# Patient Record
Sex: FEMALE | Race: WHITE
[De-identification: names, ages, dates, MRNs, and addresses within clinical notes are randomized per-mention and may not be internally consistent; named-entity substitution may affect disease eponyms.]

---

## 2017-01-03 RX ORDER — GABAPENTIN 600 MG/1
600 TABLET ORAL NIGHTLY
Qty: 90 TABLET | Refills: 1 | Status: SHIPPED | OUTPATIENT
Start: 2017-01-03 | End: 2017-06-10

## 2017-02-27 RX ORDER — LEVOTHYROXINE SODIUM 0.07 MG/1
TABLET ORAL
Qty: 30 TABLET | Refills: 0 | Status: SHIPPED | OUTPATIENT
Start: 2017-02-27 | End: 2017-06-26

## 2017-03-23 ENCOUNTER — PRIOR ORIGINAL RECORDS (OUTPATIENT)
Dept: OTHER | Age: 72
End: 2017-03-23

## 2017-03-30 ENCOUNTER — PRIOR ORIGINAL RECORDS (OUTPATIENT)
Dept: OTHER | Age: 72
End: 2017-03-30

## 2017-03-31 ENCOUNTER — HOSPITAL ENCOUNTER (OUTPATIENT)
Dept: LAB | Facility: HOSPITAL | Age: 72
Discharge: HOME | End: 2017-03-31
Attending: INTERNAL MEDICINE
Payer: MEDICARE

## 2017-03-31 PROCEDURE — 36415 COLL VENOUS BLD VENIPUNCTURE: CPT | Performed by: INTERNAL MEDICINE

## 2017-03-31 PROCEDURE — 80061 LIPID PANEL: CPT | Performed by: INTERNAL MEDICINE

## 2017-03-31 PROCEDURE — 80053 COMPREHEN METABOLIC PANEL: CPT | Performed by: INTERNAL MEDICINE

## 2017-04-03 ENCOUNTER — PRIOR ORIGINAL RECORDS (OUTPATIENT)
Dept: OTHER | Age: 72
End: 2017-04-03

## 2017-04-10 ENCOUNTER — PRIOR ORIGINAL RECORDS (OUTPATIENT)
Dept: OTHER | Age: 72
End: 2017-04-10

## 2017-04-18 ENCOUNTER — PRIOR ORIGINAL RECORDS (OUTPATIENT)
Dept: OTHER | Age: 72
End: 2017-04-18

## 2017-06-13 RX ORDER — GABAPENTIN 600 MG/1
TABLET ORAL
Qty: 30 TABLET | Refills: 0 | Status: SHIPPED | OUTPATIENT
Start: 2017-06-13 | End: 2017-06-26 | Stop reason: DRUGHIGH

## 2017-06-13 NOTE — TELEPHONE ENCOUNTER
I have only provided 30 tablets as she is due for her medicare wellness exam (45 minute spot). Please call her and schedule an appt.

## 2017-06-21 ENCOUNTER — PRIOR ORIGINAL RECORDS (OUTPATIENT)
Dept: OTHER | Age: 72
End: 2017-06-21

## 2017-06-21 ENCOUNTER — APPOINTMENT (OUTPATIENT)
Dept: LAB | Age: 72
End: 2017-06-21
Attending: INTERNAL MEDICINE
Payer: MEDICARE

## 2017-06-21 DIAGNOSIS — R73.03 PREDIABETES: ICD-10-CM

## 2017-06-21 DIAGNOSIS — E03.9 HYPOTHYROIDISM, UNSPECIFIED TYPE: ICD-10-CM

## 2017-06-21 DIAGNOSIS — E78.2 MIXED HYPERLIPIDEMIA: ICD-10-CM

## 2017-06-21 PROCEDURE — 83036 HEMOGLOBIN GLYCOSYLATED A1C: CPT

## 2017-06-21 PROCEDURE — 84450 TRANSFERASE (AST) (SGOT): CPT

## 2017-06-21 PROCEDURE — 36415 COLL VENOUS BLD VENIPUNCTURE: CPT

## 2017-06-21 PROCEDURE — 80061 LIPID PANEL: CPT

## 2017-06-21 PROCEDURE — 80048 BASIC METABOLIC PNL TOTAL CA: CPT

## 2017-06-21 PROCEDURE — 84460 ALANINE AMINO (ALT) (SGPT): CPT

## 2017-06-21 PROCEDURE — 84443 ASSAY THYROID STIM HORMONE: CPT

## 2017-06-21 PROCEDURE — 84439 ASSAY OF FREE THYROXINE: CPT

## 2017-06-26 ENCOUNTER — OFFICE VISIT (OUTPATIENT)
Dept: INTERNAL MEDICINE CLINIC | Facility: CLINIC | Age: 72
End: 2017-06-26

## 2017-06-26 VITALS
SYSTOLIC BLOOD PRESSURE: 122 MMHG | HEIGHT: 66 IN | TEMPERATURE: 99 F | RESPIRATION RATE: 20 BRPM | DIASTOLIC BLOOD PRESSURE: 64 MMHG | BODY MASS INDEX: 25.88 KG/M2 | HEART RATE: 76 BPM | WEIGHT: 161 LBS

## 2017-06-26 DIAGNOSIS — M89.9 BONE DISORDER: ICD-10-CM

## 2017-06-26 DIAGNOSIS — E78.2 MIXED HYPERLIPIDEMIA: ICD-10-CM

## 2017-06-26 DIAGNOSIS — I25.10 ATHEROSCLEROSIS OF NATIVE CORONARY ARTERY OF NATIVE HEART WITHOUT ANGINA PECTORIS: ICD-10-CM

## 2017-06-26 DIAGNOSIS — Z13.31 SCREENING FOR DEPRESSION: ICD-10-CM

## 2017-06-26 DIAGNOSIS — M47.816 FACET ARTHROPATHY, LUMBAR: ICD-10-CM

## 2017-06-26 DIAGNOSIS — E03.9 HYPOTHYROIDISM, UNSPECIFIED TYPE: ICD-10-CM

## 2017-06-26 DIAGNOSIS — R73.01 IMPAIRED FASTING GLUCOSE: ICD-10-CM

## 2017-06-26 DIAGNOSIS — Z00.00 ENCOUNTER FOR ANNUAL HEALTH EXAMINATION: Primary | ICD-10-CM

## 2017-06-26 PROCEDURE — 99214 OFFICE O/P EST MOD 30 MIN: CPT | Performed by: INTERNAL MEDICINE

## 2017-06-26 PROCEDURE — G0444 DEPRESSION SCREEN ANNUAL: HCPCS | Performed by: INTERNAL MEDICINE

## 2017-06-26 PROCEDURE — G0439 PPPS, SUBSEQ VISIT: HCPCS | Performed by: INTERNAL MEDICINE

## 2017-06-26 RX ORDER — GABAPENTIN 100 MG/1
100 CAPSULE ORAL SEE ADMIN INSTRUCTIONS
Qty: 90 CAPSULE | Refills: 0 | Status: SHIPPED | OUTPATIENT
Start: 2017-06-26 | End: 2017-06-26

## 2017-06-26 RX ORDER — LEVOTHYROXINE SODIUM 0.07 MG/1
TABLET ORAL
Qty: 90 TABLET | Refills: 3 | Status: SHIPPED | OUTPATIENT
Start: 2017-06-26 | End: 2018-05-14

## 2017-06-26 RX ORDER — GABAPENTIN 100 MG/1
100 CAPSULE ORAL SEE ADMIN INSTRUCTIONS
Qty: 90 CAPSULE | Refills: 0 | Status: SHIPPED | OUTPATIENT
Start: 2017-06-26 | End: 2018-03-21 | Stop reason: ALTCHOICE

## 2017-06-26 NOTE — PROGRESS NOTES
HPI:   Zak Adames is a 74year old female who presents for a medicare wellness exam and additional issues noted below. 1. HLP: currently doing well on repatha but only taking it every 3 weeks because it raised her BP.  Following with Dr. Veronica Juarez statins; sulfa antibiotics; and sulfa drugs cross reactors.     CURRENT MEDICATIONS:     Outpatient Prescriptions Marked as Taking for the 6/26/17 encounter (Office Visit) with Aleida Billings MD:  GABAPENTIN 600 MG Oral Tab TAKE 1 TABLET NIGHTLY   LEVOTHYROX constipation, diarrhea, melena, hematochezia, n/v.   : denies dysuria or hematuria, vaginal discharge or itching or vaginal bleeding. occ has sress urinary incontinence if she has not gone in a while.    MUSCULOSKELETAL: see HPI  NEURO: denies headaches, Maryam Majano already takes aspirin and has it on her medication list.     Diet assessment: Fair     Advanced Directive:  Living Will on file in [address]? Grady Sams does not have a Living Will on file in [address]  See Below       [address] on fi 0-No    Do you have difficulty seeing?: 1-Yes    Do you have any difficulty walking or getting up?: 0-No    Do you have any tripping hazards?: 0-No    Are you on multiple medications?: 1-Yes    Does pain affect your day to day activities?: 0-No     Have yo found. Fecal Occult Blood Annually No results found for: FOB No flowsheet data found.     Glaucoma Screening      Ophthalmology Visit Annually: Diabetics, FHx Glaucoma, AA>50, > 65 Data entered on: 7/22/2013   Last Dilated Eye Exam 7/22/2013 Lab or Procedure   Annual Monitoring of Persistent     Medications (ACE/ARB, digoxin diuretics, anticonvulsants.)    Potassium  Annually Potassium (mmol/L)   Date Value   06/21/2017 4.4     POTASSIUM (mmol/L)   Date Value   05/21/2014 4.5   08/25/2011 4.4 # Hypothryoid: well controlled on synthroid, normal TSH 6/2017  # Health Maintenance: medicare wellness exam on 6/26/2017  Colon Cancer Screening: very informed about screening, declines colnoscopy OR FIT testing  Cervical Cancer Screening: s/p PRETTY Gipson

## 2017-06-26 NOTE — PATIENT INSTRUCTIONS
Please drop off your MRI brain reports. You need 3 eight ounce servings of calcium/vitamin D daily. This includes spinach, kale, broccoli, almonds, milk, yogurt, or cottage cheese.      I do advise you get the TDAP (tetanus, diptheriae, pertussis) vacci

## 2017-07-03 NOTE — PROGRESS NOTES
She brought in MRI brain report from 10/15/2002: nonspecific small white matter lesions b/l, largest measuring only 5 mm in size. Considerations include small fossae of white matter ischemia, chronic vasculitis,  Or migraine diseasse.  Possibly demyelinatin

## 2017-07-11 ENCOUNTER — PRIOR ORIGINAL RECORDS (OUTPATIENT)
Dept: OTHER | Age: 72
End: 2017-07-11

## 2017-07-19 LAB
BUN: 15 MG/DL
CALCIUM: 9 MG/DL
CHLORIDE: 110 MEQ/L
CHOLESTEROL, TOTAL: 163 MG/DL
CREATININE, SERUM: 0.91 MG/DL
FREE T4: 1.2 MG/DL
GLUCOSE: 127 MG/DL
HDL CHOLESTEROL: 51 MG/DL
HEMOGLOBIN A1C: 6.2 %
LDL CHOLESTEROL: 79 MG/DL
POTASSIUM, SERUM: 4.4 MEQ/L
SGOT (AST): 16 IU/L
SGPT (ALT): 32 IU/L
SODIUM: 142 MEQ/L
THYROID STIMULATING HORMONE: 3.22 MLU/L
TRIGLYCERIDES: 163 MG/DL

## 2018-01-02 ENCOUNTER — PRIOR ORIGINAL RECORDS (OUTPATIENT)
Dept: OTHER | Age: 73
End: 2018-01-02

## 2018-01-02 ENCOUNTER — LABORATORY ENCOUNTER (OUTPATIENT)
Dept: LAB | Age: 73
End: 2018-01-02
Attending: INTERNAL MEDICINE
Payer: MEDICARE

## 2018-01-02 DIAGNOSIS — E78.00 PURE HYPERCHOLESTEROLEMIA: Primary | ICD-10-CM

## 2018-01-02 LAB
ALBUMIN SERPL-MCNC: 3.8 G/DL (ref 3.5–4.8)
ALP LIVER SERPL-CCNC: 73 U/L (ref 55–142)
ALT SERPL-CCNC: 36 U/L (ref 14–54)
AST SERPL-CCNC: 26 U/L (ref 15–41)
BILIRUB SERPL-MCNC: 0.4 MG/DL (ref 0.1–2)
BUN BLD-MCNC: 19 MG/DL (ref 8–20)
CALCIUM BLD-MCNC: 9.2 MG/DL (ref 8.3–10.3)
CHLORIDE: 108 MMOL/L (ref 101–111)
CHOLEST SMN-MCNC: 159 MG/DL (ref ?–200)
CO2: 27 MMOL/L (ref 22–32)
CREAT BLD-MCNC: 0.88 MG/DL (ref 0.55–1.02)
EST. AVERAGE GLUCOSE BLD GHB EST-MCNC: 143 MG/DL (ref 68–126)
GLUCOSE BLD-MCNC: 101 MG/DL (ref 70–99)
HBA1C MFR BLD HPLC: 6.6 % (ref ?–5.7)
HDLC SERPL-MCNC: 53 MG/DL (ref 45–?)
HDLC SERPL: 3 {RATIO} (ref ?–4.44)
LDLC SERPL CALC-MCNC: 86 MG/DL (ref ?–130)
M PROTEIN MFR SERPL ELPH: 7.1 G/DL (ref 6.1–8.3)
NONHDLC SERPL-MCNC: 106 MG/DL (ref ?–130)
POTASSIUM SERPL-SCNC: 4.5 MMOL/L (ref 3.6–5.1)
SODIUM SERPL-SCNC: 142 MMOL/L (ref 136–144)
TRIGL SERPL-MCNC: 100 MG/DL (ref ?–150)
VLDLC SERPL CALC-MCNC: 20 MG/DL (ref 5–40)

## 2018-01-02 PROCEDURE — 83036 HEMOGLOBIN GLYCOSYLATED A1C: CPT

## 2018-01-02 PROCEDURE — 80053 COMPREHEN METABOLIC PANEL: CPT

## 2018-01-02 PROCEDURE — 36415 COLL VENOUS BLD VENIPUNCTURE: CPT

## 2018-01-02 PROCEDURE — 80061 LIPID PANEL: CPT

## 2018-01-03 ENCOUNTER — PRIOR ORIGINAL RECORDS (OUTPATIENT)
Dept: OTHER | Age: 73
End: 2018-01-03

## 2018-01-03 LAB
ALBUMIN: 3.8 G/DL
ALKALINE PHOSPHATATE(ALK PHOS): 73 IU/L
ALT (SGPT): 36 U/L
AST (SGOT): 26 U/L
BILIRUBIN TOTAL: 0.4 MG/DL
BUN: 19 MG/DL
CALCIUM: 9.2 MG/DL
CHLORIDE: 108 MEQ/L
CHOLESTEROL, TOTAL: 159 MG/DL
CREATININE, SERUM: 0.88 MG/DL
GLUCOSE: 101 MG/DL
GLUCOSE: 101 MG/DL
HDL CHOLESTEROL: 53 MG/DL
HEMOGLOBIN A1C: 6.6 %
LDL CHOLESTEROL: 86 MG/DL
NON-HDL CHOLESTEROL: 106 MG/DL
POTASSIUM, SERUM: 4.5 MEQ/L
PROTEIN, TOTAL: 7.1 G/DL
SGOT (AST): 26 IU/L
SGPT (ALT): 36 IU/L
SODIUM: 142 MEQ/L
TOTAL CHOLESTEROL / HDL RATIO: 3 RATIO UN
TRIGLYCERIDES: 100 MG/DL

## 2018-01-08 ENCOUNTER — PRIOR ORIGINAL RECORDS (OUTPATIENT)
Dept: OTHER | Age: 73
End: 2018-01-08

## 2018-01-09 ENCOUNTER — PRIOR ORIGINAL RECORDS (OUTPATIENT)
Dept: OTHER | Age: 73
End: 2018-01-09

## 2018-01-15 ENCOUNTER — PRIOR ORIGINAL RECORDS (OUTPATIENT)
Dept: OTHER | Age: 73
End: 2018-01-15

## 2018-01-19 ENCOUNTER — PRIOR ORIGINAL RECORDS (OUTPATIENT)
Dept: OTHER | Age: 73
End: 2018-01-19

## 2018-01-24 ENCOUNTER — PRIOR ORIGINAL RECORDS (OUTPATIENT)
Dept: OTHER | Age: 73
End: 2018-01-24

## 2018-03-13 ENCOUNTER — LAB ENCOUNTER (OUTPATIENT)
Dept: LAB | Age: 73
End: 2018-03-13
Attending: INTERNAL MEDICINE
Payer: MEDICARE

## 2018-03-13 ENCOUNTER — PRIOR ORIGINAL RECORDS (OUTPATIENT)
Dept: OTHER | Age: 73
End: 2018-03-13

## 2018-03-13 DIAGNOSIS — I25.10 CORONARY ATHEROSCLEROSIS OF NATIVE CORONARY ARTERY: Primary | ICD-10-CM

## 2018-03-13 DIAGNOSIS — E78.00 PURE HYPERCHOLESTEROLEMIA: ICD-10-CM

## 2018-03-13 LAB
ALBUMIN SERPL-MCNC: 3.8 G/DL (ref 3.5–4.8)
ALP LIVER SERPL-CCNC: 78 U/L (ref 55–142)
ALT SERPL-CCNC: 26 U/L (ref 14–54)
AST SERPL-CCNC: 19 U/L (ref 15–41)
BILIRUB SERPL-MCNC: 0.6 MG/DL (ref 0.1–2)
BUN BLD-MCNC: 13 MG/DL (ref 8–20)
CALCIUM BLD-MCNC: 9.4 MG/DL (ref 8.3–10.3)
CHLORIDE: 109 MMOL/L (ref 101–111)
CHOLEST SMN-MCNC: 255 MG/DL (ref ?–200)
CO2: 26 MMOL/L (ref 22–32)
CREAT BLD-MCNC: 0.93 MG/DL (ref 0.55–1.02)
EST. AVERAGE GLUCOSE BLD GHB EST-MCNC: 131 MG/DL (ref 68–126)
GLUCOSE BLD-MCNC: 128 MG/DL (ref 70–99)
HBA1C MFR BLD HPLC: 6.2 % (ref ?–5.7)
HDLC SERPL-MCNC: 47 MG/DL (ref 45–?)
HDLC SERPL: 5.43 {RATIO} (ref ?–4.44)
LDLC SERPL CALC-MCNC: 176 MG/DL (ref ?–130)
M PROTEIN MFR SERPL ELPH: 7.2 G/DL (ref 6.1–8.3)
NONHDLC SERPL-MCNC: 208 MG/DL (ref ?–130)
POTASSIUM SERPL-SCNC: 4.4 MMOL/L (ref 3.6–5.1)
SODIUM SERPL-SCNC: 142 MMOL/L (ref 136–144)
TRIGL SERPL-MCNC: 160 MG/DL (ref ?–150)
VLDLC SERPL CALC-MCNC: 32 MG/DL (ref 5–40)

## 2018-03-13 PROCEDURE — 80053 COMPREHEN METABOLIC PANEL: CPT

## 2018-03-13 PROCEDURE — 36415 COLL VENOUS BLD VENIPUNCTURE: CPT

## 2018-03-13 PROCEDURE — 80061 LIPID PANEL: CPT

## 2018-03-13 PROCEDURE — 83036 HEMOGLOBIN GLYCOSYLATED A1C: CPT

## 2018-03-14 LAB
ALKALINE PHOSPHATATE(ALK PHOS): 78 IU/L
BILIRUBIN TOTAL: 0.6 MG/DL
BUN: 13 MG/DL
CALCIUM: 9.4 MG/DL
CHLORIDE: 109 MEQ/L
CHOLESTEROL, TOTAL: 255 MG/DL
CREATININE, SERUM: 0.93 MG/DL
GLUCOSE: 128 MG/DL
HDL CHOLESTEROL: 47 MG/DL
HEMOGLOBIN A1C: 6.2 %
LDL CHOLESTEROL: 176 MG/DL
POTASSIUM, SERUM: 4.4 MEQ/L
PROTEIN, TOTAL: 7.2 G/DL
SGOT (AST): 19 IU/L
SGPT (ALT): 26 IU/L
SODIUM: 142 MEQ/L
TRIGLYCERIDES: 160 MG/DL

## 2018-03-21 ENCOUNTER — PRIOR ORIGINAL RECORDS (OUTPATIENT)
Dept: OTHER | Age: 73
End: 2018-03-21

## 2018-03-21 ENCOUNTER — HOSPITAL ENCOUNTER (OUTPATIENT)
Dept: GENERAL RADIOLOGY | Age: 73
Discharge: HOME | End: 2018-03-21
Attending: NURSE PRACTITIONER
Payer: MEDICARE

## 2018-03-21 ENCOUNTER — OFFICE VISIT (OUTPATIENT)
Dept: INTERNAL MEDICINE CLINIC | Facility: CLINIC | Age: 73
End: 2018-03-21

## 2018-03-21 VITALS
BODY MASS INDEX: 26 KG/M2 | SYSTOLIC BLOOD PRESSURE: 88 MMHG | HEART RATE: 62 BPM | OXYGEN SATURATION: 99 % | DIASTOLIC BLOOD PRESSURE: 56 MMHG | WEIGHT: 161 LBS | RESPIRATION RATE: 16 BRPM | TEMPERATURE: 98 F

## 2018-03-21 DIAGNOSIS — M25.551 RIGHT HIP PAIN: ICD-10-CM

## 2018-03-21 DIAGNOSIS — M79.10 MYALGIA: ICD-10-CM

## 2018-03-21 DIAGNOSIS — M54.41 ACUTE RIGHT-SIDED LOW BACK PAIN WITH RIGHT-SIDED SCIATICA: Primary | ICD-10-CM

## 2018-03-21 DIAGNOSIS — M54.41 ACUTE RIGHT-SIDED LOW BACK PAIN WITH RIGHT-SIDED SCIATICA: ICD-10-CM

## 2018-03-21 DIAGNOSIS — I83.893 VARICOSE VEINS OF LEG WITH EDEMA, BILATERAL: ICD-10-CM

## 2018-03-21 PROCEDURE — 99213 OFFICE O/P EST LOW 20 MIN: CPT | Performed by: NURSE PRACTITIONER

## 2018-03-21 PROCEDURE — 73502 X-RAY EXAM HIP UNI 2-3 VIEWS: CPT | Performed by: NURSE PRACTITIONER

## 2018-03-21 PROCEDURE — 72110 X-RAY EXAM L-2 SPINE 4/>VWS: CPT | Performed by: NURSE PRACTITIONER

## 2018-03-21 NOTE — PROGRESS NOTES
CHIEF COMPLAINT:     Patient presents with:  Leg Swelling: 3x weeks Right leg swelling pt is taking OTC aleeve      HPI:   Sapna Roberts is a 74year old female who presents with bilateral leg swelling for the past 3 weeks. Denies any injury.  The swellin 1.00      Years: 30.00        Quit date: 1/18/2008  Smokeless tobacco: Never Used                      Comment: intermittent for 30 years  Alcohol use:  Yes              Comment: 1-2x/month       REVIEW OF SYSTEMS:   GENERAL: Denies fever, chills, weight ch too expensive may try Icy hot  - Encouraged to complete her DEXA scan as she has never done so, plans to complete in April    3. Myalgia  - Chronic secondary to statin use    4.  Varicose veins of leg with edema, bilateral  - Discussed compression stockings

## 2018-03-23 ENCOUNTER — PRIOR ORIGINAL RECORDS (OUTPATIENT)
Dept: OTHER | Age: 73
End: 2018-03-23

## 2018-03-26 ENCOUNTER — TELEPHONE (OUTPATIENT)
Dept: INTERNAL MEDICINE CLINIC | Facility: CLINIC | Age: 73
End: 2018-03-26

## 2018-03-26 NOTE — TELEPHONE ENCOUNTER
$800 for diclofenac? ? We had discussed if it was very expensive to try icy hot as pain is mixed arthritis and muscle strain.

## 2018-03-26 NOTE — TELEPHONE ENCOUNTER
Patient called to speak with nurse regarding ointment that was prescribed.  Ointment is $800 and pt is requesting another medication

## 2018-04-02 DIAGNOSIS — M17.11 OSTEOARTHRITIS OF RIGHT KNEE, UNSPECIFIED OSTEOARTHRITIS TYPE: Primary | ICD-10-CM

## 2018-04-02 NOTE — TELEPHONE ENCOUNTER
Diclofenac Sodium 3 % Transdermal Gel     For 3% rx is $800+ for patient; [address] calling to see if they can change to 1%?   Please callback

## 2018-04-04 ENCOUNTER — PRIOR ORIGINAL RECORDS (OUTPATIENT)
Dept: OTHER | Age: 73
End: 2018-04-04

## 2018-05-14 RX ORDER — LEVOTHYROXINE SODIUM 0.07 MG/1
TABLET ORAL
Qty: 90 TABLET | Refills: 3 | Status: SHIPPED | OUTPATIENT
Start: 2018-05-14 | End: 2019-03-13

## 2018-05-29 ENCOUNTER — TELEPHONE (OUTPATIENT)
Dept: INTERNAL MEDICINE CLINIC | Facility: CLINIC | Age: 73
End: 2018-05-29

## 2018-05-29 NOTE — TELEPHONE ENCOUNTER
Patient calling in with pain symptoms running passed her hips to her knees. Patient has been taking Aleve and that is now upsetting her stomach and is unable to bare the pain.    Offered patient an appointment with another Physician, patient refused and sta

## 2018-06-08 ENCOUNTER — OFFICE VISIT (OUTPATIENT)
Dept: INTERNAL MEDICINE CLINIC | Facility: CLINIC | Age: 73
End: 2018-06-08

## 2018-06-08 VITALS
WEIGHT: 159 LBS | BODY MASS INDEX: 24.96 KG/M2 | TEMPERATURE: 98 F | DIASTOLIC BLOOD PRESSURE: 82 MMHG | SYSTOLIC BLOOD PRESSURE: 120 MMHG | RESPIRATION RATE: 16 BRPM | HEIGHT: 67 IN | HEART RATE: 66 BPM

## 2018-06-08 DIAGNOSIS — Z12.83 SCREENING EXAM FOR SKIN CANCER: ICD-10-CM

## 2018-06-08 DIAGNOSIS — E78.2 MIXED HYPERLIPIDEMIA: ICD-10-CM

## 2018-06-08 DIAGNOSIS — M85.80 OSTEOPENIA, UNSPECIFIED LOCATION: ICD-10-CM

## 2018-06-08 DIAGNOSIS — M54.41 CHRONIC MIDLINE LOW BACK PAIN WITH BILATERAL SCIATICA: ICD-10-CM

## 2018-06-08 DIAGNOSIS — G89.29 CHRONIC MIDLINE LOW BACK PAIN WITH BILATERAL SCIATICA: ICD-10-CM

## 2018-06-08 DIAGNOSIS — I25.10 ATHEROSCLEROSIS OF NATIVE CORONARY ARTERY OF NATIVE HEART WITHOUT ANGINA PECTORIS: ICD-10-CM

## 2018-06-08 DIAGNOSIS — M54.42 CHRONIC MIDLINE LOW BACK PAIN WITH BILATERAL SCIATICA: ICD-10-CM

## 2018-06-08 DIAGNOSIS — Z00.00 ROUTINE GENERAL MEDICAL EXAMINATION AT A HEALTH CARE FACILITY: Primary | ICD-10-CM

## 2018-06-08 DIAGNOSIS — M89.9 DISORDER OF BONE AND CARTILAGE: ICD-10-CM

## 2018-06-08 DIAGNOSIS — E03.8 OTHER SPECIFIED HYPOTHYROIDISM: ICD-10-CM

## 2018-06-08 DIAGNOSIS — R73.01 IMPAIRED FASTING GLUCOSE: ICD-10-CM

## 2018-06-08 DIAGNOSIS — Z12.11 SCREEN FOR COLON CANCER: ICD-10-CM

## 2018-06-08 DIAGNOSIS — M47.816 FACET ARTHROPATHY, LUMBAR: ICD-10-CM

## 2018-06-08 DIAGNOSIS — M94.9 DISORDER OF BONE AND CARTILAGE: ICD-10-CM

## 2018-06-08 PROCEDURE — G0444 DEPRESSION SCREEN ANNUAL: HCPCS | Performed by: INTERNAL MEDICINE

## 2018-06-08 PROCEDURE — G0439 PPPS, SUBSEQ VISIT: HCPCS | Performed by: INTERNAL MEDICINE

## 2018-06-08 PROCEDURE — 99214 OFFICE O/P EST MOD 30 MIN: CPT | Performed by: INTERNAL MEDICINE

## 2018-06-08 NOTE — PROGRESS NOTES
Marti Wood is a 74year old female. HPI:   Patient presents for the following issues and medicare wellness exam.   1. HLP: she has resumed repatha in 4/2018 and taking it every 3 weeks to avoid hyperglycemia.   2. Impaired fasting glucose: repatha 164 lb  05/31/16 : 161 lb  02/22/16 : 159 lb        Penicillins             RASH, ITCHING  Statins                     Comment:myalgia  Sulfa Antibiotics         Sulfa Drugs Cross R*    RASH, ITCHING    Family History   Problem Relation Age of Onset   • He PLAN:   # Bilateral Lumbar Facet Arthritis causing low back pain with b/l sciatica: nsaids causing abdominal pain.  Discussed tylenol, core strengthening/stretches, PT, referral to spine team. At this time, she would like to start with HEP and tylenol for n

## 2018-06-08 NOTE — PATIENT INSTRUCTIONS
Please have fasting labs completed in early July. Please do not exceed 3000 mg (3 grams) of tylenol in 24 hours.    It is very important you look at the amount of tylenol (acetaminophen) in any of your over the counter medications to ensure you do not e

## 2018-07-06 ENCOUNTER — APPOINTMENT (OUTPATIENT)
Dept: LAB | Age: 73
End: 2018-07-06
Attending: INTERNAL MEDICINE
Payer: MEDICARE

## 2018-07-06 ENCOUNTER — PRIOR ORIGINAL RECORDS (OUTPATIENT)
Dept: OTHER | Age: 73
End: 2018-07-06

## 2018-07-06 ENCOUNTER — TELEPHONE (OUTPATIENT)
Dept: INTERNAL MEDICINE CLINIC | Facility: CLINIC | Age: 73
End: 2018-07-06

## 2018-07-06 DIAGNOSIS — E78.2 MIXED HYPERLIPIDEMIA: ICD-10-CM

## 2018-07-06 DIAGNOSIS — E03.8 OTHER SPECIFIED HYPOTHYROIDISM: ICD-10-CM

## 2018-07-06 DIAGNOSIS — I25.10 ATHEROSCLEROSIS OF CORONARY ARTERY OF NATIVE HEART, ANGINA PRESENCE UNSPECIFIED, UNSPECIFIED VESSEL OR LESION TYPE: ICD-10-CM

## 2018-07-06 DIAGNOSIS — R73.01 IMPAIRED FASTING GLUCOSE: ICD-10-CM

## 2018-07-06 DIAGNOSIS — I25.10 ATHEROSCLEROSIS OF NATIVE CORONARY ARTERY OF NATIVE HEART WITHOUT ANGINA PECTORIS: ICD-10-CM

## 2018-07-06 DIAGNOSIS — R73.01 IMPAIRED FASTING GLUCOSE: Primary | ICD-10-CM

## 2018-07-06 LAB
ALBUMIN SERPL-MCNC: 3.7 G/DL (ref 3.5–4.8)
ALP LIVER SERPL-CCNC: 75 U/L (ref 55–142)
ALT SERPL-CCNC: 38 U/L (ref 14–54)
AST SERPL-CCNC: 24 U/L (ref 15–41)
BILIRUB SERPL-MCNC: 0.6 MG/DL (ref 0.1–2)
BUN BLD-MCNC: 16 MG/DL (ref 8–20)
CALCIUM BLD-MCNC: 9.2 MG/DL (ref 8.3–10.3)
CHLORIDE: 108 MMOL/L (ref 101–111)
CHOLEST SMN-MCNC: 154 MG/DL (ref ?–200)
CO2: 26 MMOL/L (ref 22–32)
CREAT BLD-MCNC: 0.99 MG/DL (ref 0.55–1.02)
EST. AVERAGE GLUCOSE BLD GHB EST-MCNC: 143 MG/DL (ref 68–126)
FREE T4: 1.2 NG/DL (ref 0.9–1.8)
GLUCOSE BLD-MCNC: 115 MG/DL (ref 70–99)
HBA1C MFR BLD HPLC: 6.6 % (ref ?–5.7)
HDLC SERPL-MCNC: 50 MG/DL (ref 45–?)
HDLC SERPL: 3.08 {RATIO} (ref ?–4.44)
LDLC SERPL CALC-MCNC: 73 MG/DL (ref ?–130)
M PROTEIN MFR SERPL ELPH: 7.1 G/DL (ref 6.1–8.3)
NONHDLC SERPL-MCNC: 104 MG/DL (ref ?–130)
POTASSIUM SERPL-SCNC: 4.2 MMOL/L (ref 3.6–5.1)
SODIUM SERPL-SCNC: 142 MMOL/L (ref 136–144)
TRIGL SERPL-MCNC: 153 MG/DL (ref ?–150)
TSI SER-ACNC: 3.4 MIU/ML (ref 0.35–5.5)
VLDLC SERPL CALC-MCNC: 31 MG/DL (ref 5–40)

## 2018-07-06 PROCEDURE — 83036 HEMOGLOBIN GLYCOSYLATED A1C: CPT

## 2018-07-06 PROCEDURE — 36415 COLL VENOUS BLD VENIPUNCTURE: CPT

## 2018-07-06 PROCEDURE — 84443 ASSAY THYROID STIM HORMONE: CPT

## 2018-07-06 PROCEDURE — 80053 COMPREHEN METABOLIC PANEL: CPT

## 2018-07-06 PROCEDURE — 80061 LIPID PANEL: CPT

## 2018-07-06 PROCEDURE — 84439 ASSAY OF FREE THYROXINE: CPT

## 2018-07-06 NOTE — TELEPHONE ENCOUNTER
Requesting CMP as Dr. Sree Peace (cardiology) prefers, [facility] blanka STYLES ordered CMP

## 2018-07-10 ENCOUNTER — PRIOR ORIGINAL RECORDS (OUTPATIENT)
Dept: OTHER | Age: 73
End: 2018-07-10

## 2018-07-10 LAB
ALBUMIN: 3.7 G/DL
ALKALINE PHOSPHATATE(ALK PHOS): 75 IU/L
ALT (SGPT): 38 U/L
AST (SGOT): 24 U/L
BILIRUBIN TOTAL: 0.6 MG/DL
BUN: 16 MG/DL
CALCIUM: 9.2 MG/DL
CHLORIDE: 108 MEQ/L
CHOLESTEROL, TOTAL: 154 MG/DL
CHOLESTEROL, TOTAL: 154 MG/DL
CREATININE, SERUM: 0.99 MG/DL
FREE T4: 1.2 MG/DL
GLUCOSE: 115 MG/DL
GLUCOSE: 115 MG/DL
HDL CHOLESTEROL: 50 MG/DL
HDL CHOLESTEROL: 50 MG/DL
HEMOGLOBIN A1C: 6.6 %
LDL CHOLESTEROL: 73 MG/DL
LDL CHOLESTEROL: 73 MG/DL
NON-HDL CHOLESTEROL: 104 MG/DL
NON-HDL CHOLESTEROL: 104 MG/DL
POTASSIUM, SERUM: 4.2 MEQ/L
PROTEIN, TOTAL: 7.1 G/DL
SGOT (AST): 24 IU/L
SGPT (ALT): 38 IU/L
SODIUM: 142 MEQ/L
THYROID STIMULATING HORMONE: 3.4 MLU/L
TOTAL CHOLESTEROL / HDL RATIO: 3.08 RATIO UN
TRIGLYCERIDES: 153 MG/DL
TRIGLYCERIDES: 153 MG/DL

## 2018-07-12 ENCOUNTER — TELEPHONE (OUTPATIENT)
Dept: INTERNAL MEDICINE CLINIC | Facility: CLINIC | Age: 73
End: 2018-07-12

## 2018-07-12 NOTE — TELEPHONE ENCOUNTER
Dr. Daya Chowdhury asked her a question through [employer] and she doesn't know how to answer it on there.  Please call

## 2018-07-12 NOTE — TELEPHONE ENCOUNTER
Patient called she is currently taking Repatha every 3 weeks but will start repatha every 4 weeks to see if glucose improves.  Patient due for lab work in October for Dr. Halle Maldonado

## 2018-07-12 NOTE — TELEPHONE ENCOUNTER
Please ask her to add my blood-work onto labs in October (A1C order is in the system). She also needs to make an office visit with me after labs are completed in October, 2018.

## 2018-07-12 NOTE — TELEPHONE ENCOUNTER
Patient informed of orders, patient upset duplicate order as Dr. Veronica Juarez already ordered, removed Dr. Annie Higgins order with instructions to patient cc: to Dr. Eloy Knapp, patient verbalized understanding

## 2018-07-24 ENCOUNTER — HOSPITAL ENCOUNTER (OUTPATIENT)
Dept: BONE DENSITY | Age: 73
Discharge: HOME | End: 2018-07-24
Attending: INTERNAL MEDICINE
Payer: MEDICARE

## 2018-07-24 DIAGNOSIS — M89.9 DISORDER OF BONE AND CARTILAGE: ICD-10-CM

## 2018-07-24 DIAGNOSIS — M94.9 DISORDER OF BONE AND CARTILAGE: ICD-10-CM

## 2018-07-24 PROCEDURE — 77080 DXA BONE DENSITY AXIAL: CPT | Performed by: INTERNAL MEDICINE

## 2018-07-25 ENCOUNTER — TELEPHONE (OUTPATIENT)
Dept: INTERNAL MEDICINE CLINIC | Facility: CLINIC | Age: 73
End: 2018-07-25

## 2018-09-06 ENCOUNTER — TELEPHONE (OUTPATIENT)
Dept: INTERNAL MEDICINE CLINIC | Facility: CLINIC | Age: 73
End: 2018-09-06

## 2018-09-06 ENCOUNTER — OFFICE VISIT (OUTPATIENT)
Dept: FAMILY MEDICINE CLINIC | Facility: CLINIC | Age: 73
End: 2018-09-06
Payer: MEDICARE

## 2018-09-06 VITALS
DIASTOLIC BLOOD PRESSURE: 70 MMHG | HEART RATE: 76 BPM | OXYGEN SATURATION: 97 % | BODY MASS INDEX: 25.71 KG/M2 | RESPIRATION RATE: 16 BRPM | SYSTOLIC BLOOD PRESSURE: 114 MMHG | HEIGHT: 66 IN | TEMPERATURE: 99 F | WEIGHT: 160 LBS

## 2018-09-06 DIAGNOSIS — J06.9 URI WITH COUGH AND CONGESTION: Primary | ICD-10-CM

## 2018-09-06 PROCEDURE — 99213 OFFICE O/P EST LOW 20 MIN: CPT | Performed by: NURSE PRACTITIONER

## 2018-09-06 RX ORDER — AZITHROMYCIN 250 MG/1
TABLET, FILM COATED ORAL
Qty: 6 TABLET | Refills: 0 | Status: SHIPPED | OUTPATIENT
Start: 2018-09-06 | End: 2018-10-16

## 2018-09-06 NOTE — PROGRESS NOTES
CHIEF COMPLAINT:   Patient presents with:  Sinus Problem: sinus pressure (frontal)  Cough: with yellow phlegm    HPI:   Marti Wood is a 74year old female who presents with upper respiratory symptoms for  3  days.  Patient reports congestion, low grade Smoking status: Former Smoker                                                              Packs/day: 1.00      Years: 30.00        Quit date: 1/18/2008  Smokeless tobacco: Never Used                      Comment: intermittent for 30 years  Alcohol use: Jacob Patten Sig: Take two tablets by mouth today, then one daily. Risks, benefits, and side effects of medication explained and discussed.     Patient Instructions     Viral Upper Respiratory Illness (Adult)  You have a viral upper respiratory illness (URI · Over-the-counter cold medicines will not shorten the length of time you’re sick, but they may be helpful for the following symptoms: cough, sore throat, and nasal and sinus congestion.  (Note: Do not use decongestants if you have high blood pressure.)  Fo

## 2018-09-06 NOTE — TELEPHONE ENCOUNTER
Patient calling in seeking an appointment today due to a low grade fever, congestion, headache, pressure, etc.    Please call patient to discuss symptoms/appointment options.

## 2018-09-06 NOTE — TELEPHONE ENCOUNTER
Called pt and she states that \"I have a cold\". She stated the symptoms started on Tuesday and yesterday she started to develop a low grade fever. Pt states that she has congestion and cough with \"yellow-like\" phlegm.  Pt states she has a susceptibility

## 2018-10-02 ENCOUNTER — PRIOR ORIGINAL RECORDS (OUTPATIENT)
Dept: OTHER | Age: 73
End: 2018-10-02

## 2018-10-02 ENCOUNTER — LAB ENCOUNTER (OUTPATIENT)
Dept: LAB | Age: 73
End: 2018-10-02
Attending: INTERNAL MEDICINE
Payer: MEDICARE

## 2018-10-02 DIAGNOSIS — I50.1 LEFT HEART FAILURE (HCC): ICD-10-CM

## 2018-10-02 DIAGNOSIS — I25.10 CORONARY ATHEROSCLEROSIS OF NATIVE CORONARY ARTERY: Primary | ICD-10-CM

## 2018-10-02 DIAGNOSIS — E78.00 PURE HYPERCHOLESTEROLEMIA: ICD-10-CM

## 2018-10-02 PROCEDURE — 80061 LIPID PANEL: CPT

## 2018-10-02 PROCEDURE — 83036 HEMOGLOBIN GLYCOSYLATED A1C: CPT

## 2018-10-02 PROCEDURE — 80053 COMPREHEN METABOLIC PANEL: CPT

## 2018-10-02 PROCEDURE — 36415 COLL VENOUS BLD VENIPUNCTURE: CPT

## 2018-10-08 ENCOUNTER — PRIOR ORIGINAL RECORDS (OUTPATIENT)
Dept: OTHER | Age: 73
End: 2018-10-08

## 2018-10-08 LAB
ALBUMIN: 3.7 G/DL
ALKALINE PHOSPHATATE(ALK PHOS): 75 IU/L
ALT (SGPT): 34 U/L
AST (SGOT): 19 U/L
BILIRUBIN TOTAL: 0.6 MG/DL
BUN: 17 MG/DL
CALCIUM: 8.8 MG/DL
CHLORIDE: 110 MEQ/L
CHOLESTEROL, TOTAL: 173 MG/DL
CREATININE, SERUM: 0.86 MG/DL
GLOBULIN: 3.3 G/DL
GLUCOSE: 119 MG/DL
GLUCOSE: 119 MG/DL
HDL CHOLESTEROL: 48 MG/DL
HEMOGLOBIN A1C: 6.5 %
LDL CHOLESTEROL: 94 MG/DL
NON-HDL CHOLESTEROL: 125 MG/DL
POTASSIUM, SERUM: 4.6 MEQ/L
PROTEIN, TOTAL: 7 G/DL
SGOT (AST): 19 IU/L
SGPT (ALT): 34 IU/L
SODIUM: 142 MEQ/L
TRIGLYCERIDES: 157 MG/DL

## 2018-10-09 ENCOUNTER — PRIOR ORIGINAL RECORDS (OUTPATIENT)
Dept: OTHER | Age: 73
End: 2018-10-09

## 2018-10-16 ENCOUNTER — APPOINTMENT (OUTPATIENT)
Dept: LAB | Age: 73
End: 2018-10-16
Attending: INTERNAL MEDICINE
Payer: MEDICARE

## 2018-10-16 ENCOUNTER — OFFICE VISIT (OUTPATIENT)
Dept: INTERNAL MEDICINE CLINIC | Facility: CLINIC | Age: 73
End: 2018-10-16
Payer: MEDICARE

## 2018-10-16 ENCOUNTER — TELEPHONE (OUTPATIENT)
Dept: INTERNAL MEDICINE CLINIC | Facility: CLINIC | Age: 73
End: 2018-10-16

## 2018-10-16 VITALS
BODY MASS INDEX: 26.52 KG/M2 | HEIGHT: 66 IN | RESPIRATION RATE: 20 BRPM | WEIGHT: 165 LBS | TEMPERATURE: 99 F | HEART RATE: 68 BPM | OXYGEN SATURATION: 98 % | DIASTOLIC BLOOD PRESSURE: 64 MMHG | SYSTOLIC BLOOD PRESSURE: 118 MMHG

## 2018-10-16 DIAGNOSIS — M81.8 AGE-RELATED OSTEOPOROSIS WITHOUT FRACTURE: ICD-10-CM

## 2018-10-16 DIAGNOSIS — E03.8 OTHER SPECIFIED HYPOTHYROIDISM: ICD-10-CM

## 2018-10-16 DIAGNOSIS — E11.9 TYPE 2 DIABETES MELLITUS WITHOUT COMPLICATION, WITHOUT LONG-TERM CURRENT USE OF INSULIN (HCC): Primary | ICD-10-CM

## 2018-10-16 DIAGNOSIS — E78.2 MIXED HYPERLIPIDEMIA: ICD-10-CM

## 2018-10-16 DIAGNOSIS — M47.816 FACET ARTHROPATHY, LUMBAR: ICD-10-CM

## 2018-10-16 PROCEDURE — 82306 VITAMIN D 25 HYDROXY: CPT

## 2018-10-16 PROCEDURE — 99215 OFFICE O/P EST HI 40 MIN: CPT | Performed by: INTERNAL MEDICINE

## 2018-10-16 PROCEDURE — 36415 COLL VENOUS BLD VENIPUNCTURE: CPT

## 2018-10-16 NOTE — TELEPHONE ENCOUNTER
Patient called to inform office she received her flu vaccine @ [address] [place] [address] .  Patient informed once documentation received from [place] her record would be updated, patient verbalized understanding

## 2018-10-16 NOTE — PROGRESS NOTES
Viviana Dong is a 74year old female. Patient presents for the following issues. 1. Diabetes: she is convinced the repatha is causing her A1C elevation. Per review her A1C has been in 6.5 range since 2011 even when she was NOT on repatha.  She th depression or anxiety.     EXT: denies edema    Wt Readings from Last 6 Encounters:  10/16/18 : 165 lb  09/06/18 : 160 lb  06/08/18 : 159 lb  03/21/18 : 161 lb  06/26/17 : 161 lb  10/21/16 : 164 lb        Penicillins             RASH, ITCHING  Statins normal, visualized feet and the appearance is normal.  Bilateral monofilament/sensation of both feet is normal.  Pulsation pedal pulse exam of both lower legs/feet is normal as well.         ASSESSMENT AND PLAN:   # Type 2 Diabetes, diet controlled: A1C britney 2010. Prevnar 13 in 11/2015, Pneumo 23 in 11/2016. Cont yearly flu shot. Advised on TDAP and shingrix. GOC: Discussed again, she still has resources. Granddaughter Merly Severino is local and daughter lives in [place]. Healthcare Living Will: does not have one.  Full

## 2018-10-16 NOTE — PATIENT INSTRUCTIONS
You can use 400-800 mg of ibuprofen 3-4 times daily. You should not exceed 2400 mg in 24 hours. ALWAYS take with food.  Do not continue this high dosing for more than 2-4 weeks as ibuprofen products can cause stomach ulcers, worsen heartburn, affect the kid

## 2018-10-17 RX ORDER — ALENDRONATE SODIUM 70 MG/1
70 TABLET ORAL WEEKLY
Qty: 12 TABLET | Refills: 3 | Status: SHIPPED | OUTPATIENT
Start: 2018-10-17 | End: 2020-09-01

## 2018-11-15 ENCOUNTER — PATIENT MESSAGE (OUTPATIENT)
Dept: INTERNAL MEDICINE CLINIC | Facility: CLINIC | Age: 73
End: 2018-11-15

## 2018-11-15 NOTE — TELEPHONE ENCOUNTER
From: Evelyn Solano  To: KRYS Carbajal  Sent: 11/15/2018 9:39 AM CST  Subject: Non-Urgent Medical Question    I am frustrated with the \"My Chart\" I complete the testing that I have discussed with Dr. Conchita Domingo and keep getting notices that they ar

## 2018-11-21 ENCOUNTER — APPOINTMENT (OUTPATIENT)
Dept: LAB | Age: 73
End: 2018-11-21
Attending: INTERNAL MEDICINE
Payer: MEDICARE

## 2018-12-05 ENCOUNTER — PRIOR ORIGINAL RECORDS (OUTPATIENT)
Dept: OTHER | Age: 73
End: 2018-12-05

## 2018-12-10 ENCOUNTER — PRIOR ORIGINAL RECORDS (OUTPATIENT)
Dept: OTHER | Age: 73
End: 2018-12-10

## 2018-12-21 ENCOUNTER — PRIOR ORIGINAL RECORDS (OUTPATIENT)
Dept: OTHER | Age: 73
End: 2018-12-21

## 2019-01-02 ENCOUNTER — PRIOR ORIGINAL RECORDS (OUTPATIENT)
Dept: OTHER | Age: 74
End: 2019-01-02

## 2019-01-11 ENCOUNTER — PRIOR ORIGINAL RECORDS (OUTPATIENT)
Dept: OTHER | Age: 74
End: 2019-01-11

## 2019-01-15 ENCOUNTER — LAB ENCOUNTER (OUTPATIENT)
Dept: LAB | Age: 74
End: 2019-01-15
Attending: INTERNAL MEDICINE
Payer: MEDICARE

## 2019-01-15 ENCOUNTER — PRIOR ORIGINAL RECORDS (OUTPATIENT)
Dept: OTHER | Age: 74
End: 2019-01-15

## 2019-01-15 DIAGNOSIS — E78.2 MIXED HYPERLIPIDEMIA: ICD-10-CM

## 2019-01-15 DIAGNOSIS — E03.8 OTHER SPECIFIED HYPOTHYROIDISM: ICD-10-CM

## 2019-01-15 DIAGNOSIS — E11.9 TYPE 2 DIABETES MELLITUS WITHOUT COMPLICATION, WITHOUT LONG-TERM CURRENT USE OF INSULIN (HCC): ICD-10-CM

## 2019-01-15 DIAGNOSIS — I25.10 CAD (CORONARY ARTERY DISEASE): Primary | ICD-10-CM

## 2019-01-15 LAB
ALT SERPL-CCNC: 37 U/L (ref 14–54)
ANION GAP SERPL CALC-SCNC: 5 MMOL/L (ref 0–18)
AST SERPL-CCNC: 21 U/L (ref 15–41)
BUN BLD-MCNC: 14 MG/DL (ref 8–20)
BUN/CREAT SERPL: 13.9 (ref 10–20)
CALCIUM BLD-MCNC: 9 MG/DL (ref 8.3–10.3)
CHLORIDE SERPL-SCNC: 108 MMOL/L (ref 101–111)
CHOLEST SMN-MCNC: 159 MG/DL (ref ?–200)
CO2 SERPL-SCNC: 27 MMOL/L (ref 22–32)
CREAT BLD-MCNC: 1.01 MG/DL (ref 0.55–1.02)
CREAT UR-SCNC: 206 MG/DL
EST. AVERAGE GLUCOSE BLD GHB EST-MCNC: 143 MG/DL (ref 68–126)
GLUCOSE BLD-MCNC: 127 MG/DL (ref 70–99)
HBA1C MFR BLD HPLC: 6.6 % (ref ?–5.7)
HDLC SERPL-MCNC: 52 MG/DL (ref 40–59)
LDLC SERPL CALC-MCNC: 73 MG/DL (ref ?–100)
MICROALBUMIN UR-MCNC: 1.66 MG/DL
MICROALBUMIN/CREAT 24H UR-RTO: 8.1 UG/MG (ref ?–30)
NONHDLC SERPL-MCNC: 107 MG/DL (ref ?–130)
OSMOLALITY SERPL CALC.SUM OF ELEC: 292 MOSM/KG (ref 275–295)
POTASSIUM SERPL-SCNC: 4.1 MMOL/L (ref 3.6–5.1)
SODIUM SERPL-SCNC: 140 MMOL/L (ref 136–144)
TRIGL SERPL-MCNC: 171 MG/DL (ref 30–149)
VLDLC SERPL CALC-MCNC: 34 MG/DL (ref 0–30)

## 2019-01-15 PROCEDURE — 82043 UR ALBUMIN QUANTITATIVE: CPT

## 2019-01-15 PROCEDURE — 84460 ALANINE AMINO (ALT) (SGPT): CPT

## 2019-01-15 PROCEDURE — 80061 LIPID PANEL: CPT

## 2019-01-15 PROCEDURE — 80048 BASIC METABOLIC PNL TOTAL CA: CPT

## 2019-01-15 PROCEDURE — 82570 ASSAY OF URINE CREATININE: CPT

## 2019-01-15 PROCEDURE — 83036 HEMOGLOBIN GLYCOSYLATED A1C: CPT

## 2019-01-15 PROCEDURE — 84450 TRANSFERASE (AST) (SGOT): CPT

## 2019-01-15 PROCEDURE — 36415 COLL VENOUS BLD VENIPUNCTURE: CPT

## 2019-01-18 ENCOUNTER — PRIOR ORIGINAL RECORDS (OUTPATIENT)
Dept: OTHER | Age: 74
End: 2019-01-18

## 2019-01-19 ENCOUNTER — TELEPHONE (OUTPATIENT)
Dept: INTERNAL MEDICINE CLINIC | Facility: CLINIC | Age: 74
End: 2019-01-19

## 2019-01-22 ENCOUNTER — MYAURORA ACCOUNT LINK (OUTPATIENT)
Dept: OTHER | Age: 74
End: 2019-01-22

## 2019-01-22 ENCOUNTER — PRIOR ORIGINAL RECORDS (OUTPATIENT)
Dept: OTHER | Age: 74
End: 2019-01-22

## 2019-01-23 LAB
CHOLESTEROL, TOTAL: 159 MG/DL
HDL CHOLESTEROL: 52 MG/DL
HEMOGLOBIN A1C: 6.6 %
LDL CHOLESTEROL: 73 MG/DL
NON-HDL CHOLESTEROL: 107 MG/DL
TRIGLYCERIDES: 171 MG/DL

## 2019-02-01 LAB
BUN: 14 MG/DL
CALCIUM: 9 MG/DL
CHLORIDE: 108 MEQ/L
CREATININE, SERUM: 1.01 MG/DL
GLUCOSE: 127 MG/DL
POTASSIUM, SERUM: 4.1 MEQ/L
SGOT (AST): 21 IU/L
SGPT (ALT): 37 IU/L
SODIUM: 140 MEQ/L

## 2019-02-28 VITALS
WEIGHT: 162 LBS | HEART RATE: 68 BPM | DIASTOLIC BLOOD PRESSURE: 78 MMHG | SYSTOLIC BLOOD PRESSURE: 142 MMHG | BODY MASS INDEX: 24.55 KG/M2 | HEIGHT: 68 IN

## 2019-02-28 VITALS
SYSTOLIC BLOOD PRESSURE: 106 MMHG | HEIGHT: 68 IN | DIASTOLIC BLOOD PRESSURE: 70 MMHG | WEIGHT: 167 LBS | BODY MASS INDEX: 25.31 KG/M2 | HEART RATE: 74 BPM

## 2019-02-28 VITALS
BODY MASS INDEX: 24.86 KG/M2 | HEART RATE: 61 BPM | WEIGHT: 164 LBS | DIASTOLIC BLOOD PRESSURE: 66 MMHG | SYSTOLIC BLOOD PRESSURE: 126 MMHG | HEIGHT: 68 IN

## 2019-02-28 VITALS
DIASTOLIC BLOOD PRESSURE: 59 MMHG | SYSTOLIC BLOOD PRESSURE: 146 MMHG | HEIGHT: 66 IN | BODY MASS INDEX: 27.16 KG/M2 | WEIGHT: 169 LBS | HEART RATE: 57 BPM

## 2019-03-06 ENCOUNTER — TELEPHONE (OUTPATIENT)
Dept: INTERNAL MEDICINE CLINIC | Facility: CLINIC | Age: 74
End: 2019-03-06

## 2019-03-13 RX ORDER — LEVOTHYROXINE SODIUM 0.07 MG/1
TABLET ORAL
Qty: 90 TABLET | Refills: 3 | Status: SHIPPED | OUTPATIENT
Start: 2019-03-13 | End: 2019-10-21

## 2019-03-13 NOTE — TELEPHONE ENCOUNTER
Refill requested:   Requested Prescriptions     Pending Prescriptions Disp Refills   • LEVOTHYROXINE SODIUM 75 MCG Oral Tab [Pharmacy Med Name: LEVOTHYROXINE SODIUM 75 MCG Tablet] 90 tablet 3     Sig: TAKE 1 TABLET EVERY DAY. OVERDUE FOR FASTING LABS.

## 2019-04-02 ENCOUNTER — TELEPHONE (OUTPATIENT)
Dept: INTERNAL MEDICINE CLINIC | Facility: CLINIC | Age: 74
End: 2019-04-02

## 2019-04-02 NOTE — TELEPHONE ENCOUNTER
Pt states that she has a hx of pneumonia. Developed ST and dry cough x 2 days ago and this Am woke up with pain in the \"lung area\" which worsens with inhalation. Denies fever, SOB, wheezing. Has been taking theraflu with relief.  Would like to

## 2019-04-11 RX ORDER — LEVOTHYROXINE SODIUM 88 UG/1
TABLET ORAL
COMMUNITY
Start: 2012-06-04

## 2019-04-11 RX ORDER — AMOXICILLIN 500 MG
CAPSULE ORAL
COMMUNITY

## 2019-06-14 ENCOUNTER — OFFICE VISIT (OUTPATIENT)
Dept: INTERNAL MEDICINE CLINIC | Facility: CLINIC | Age: 74
End: 2019-06-14
Payer: MEDICARE

## 2019-06-14 VITALS
DIASTOLIC BLOOD PRESSURE: 64 MMHG | HEART RATE: 62 BPM | OXYGEN SATURATION: 96 % | TEMPERATURE: 99 F | WEIGHT: 167.25 LBS | HEIGHT: 66 IN | RESPIRATION RATE: 18 BRPM | SYSTOLIC BLOOD PRESSURE: 122 MMHG | BODY MASS INDEX: 26.88 KG/M2

## 2019-06-14 DIAGNOSIS — M67.911 DISORDER OF RIGHT ROTATOR CUFF: ICD-10-CM

## 2019-06-14 DIAGNOSIS — Z13.31 SCREENING FOR DEPRESSION: ICD-10-CM

## 2019-06-14 DIAGNOSIS — E11.9 TYPE 2 DIABETES MELLITUS WITHOUT COMPLICATION, WITHOUT LONG-TERM CURRENT USE OF INSULIN (HCC): ICD-10-CM

## 2019-06-14 DIAGNOSIS — Z00.00 ROUTINE GENERAL MEDICAL EXAMINATION AT A HEALTH CARE FACILITY: Primary | ICD-10-CM

## 2019-06-14 DIAGNOSIS — E78.2 MIXED HYPERLIPIDEMIA: ICD-10-CM

## 2019-06-14 DIAGNOSIS — I25.10 ATHEROSCLEROSIS OF NATIVE CORONARY ARTERY OF NATIVE HEART WITHOUT ANGINA PECTORIS: ICD-10-CM

## 2019-06-14 DIAGNOSIS — M47.816 FACET ARTHROPATHY, LUMBAR: ICD-10-CM

## 2019-06-14 DIAGNOSIS — E03.9 HYPOTHYROIDISM, UNSPECIFIED TYPE: ICD-10-CM

## 2019-06-14 DIAGNOSIS — Z12.11 SCREEN FOR COLON CANCER: ICD-10-CM

## 2019-06-14 PROCEDURE — 99214 OFFICE O/P EST MOD 30 MIN: CPT | Performed by: INTERNAL MEDICINE

## 2019-06-14 PROCEDURE — G0444 DEPRESSION SCREEN ANNUAL: HCPCS | Performed by: INTERNAL MEDICINE

## 2019-06-14 PROCEDURE — G0439 PPPS, SUBSEQ VISIT: HCPCS | Performed by: INTERNAL MEDICINE

## 2019-06-14 PROCEDURE — 83036 HEMOGLOBIN GLYCOSYLATED A1C: CPT | Performed by: INTERNAL MEDICINE

## 2019-06-14 NOTE — PROGRESS NOTES
Diana Funk is a 74year old female. Patient presents for medicare wellness exam and additional issues noted below. 1. Osteoporosis: she did not start alendronate after reading side effects. She was worried about the GERD.    2. Hypothyroidism: • Heart Disorder Mother 80        MI   • Other (Autoimmune disease) Other         fam hx   • Heart Disease Other         fam hx   • Other (Other) Self 1973        cervical cancer ( carcinoma of insitu      Past Medical History:   Diagnosis Date   • Endoc discussed HEP, handout provided. # Type 2 Diabetes, diet controlled: A1C around 6.5 sine 2011 even when she was not on repatha.   - continue to reinforce healthy plant based nutrition but her motivation is poor  - DM Eye Exam on 1/11/2019 - no diabetic re shingrix. [facility]: Discussed again, she still has resources.  Granddaughter Skylar Arango is her POA    Care Team:  [facility] Cardiology - [address] on 3/13/2020  DM eye exam on 3/12/2020 by David Lemus does not show diabetic retinopa

## 2019-06-14 NOTE — PATIENT INSTRUCTIONS
You are due for your fasting labs in July, 2019. Please send in 3 stool samples in November, 2019. Please have your diabetic eye exam in January, 2020.      Please focus on eating healthy plant based nutrition which includes fresh vegetables, fresh f

## 2019-06-18 ENCOUNTER — TELEPHONE (OUTPATIENT)
Dept: INTERNAL MEDICINE CLINIC | Facility: CLINIC | Age: 74
End: 2019-06-18

## 2019-06-18 NOTE — TELEPHONE ENCOUNTER
Faxed signed order to hearing health care    Faxed on 06- with confirmation    Fax #--[phone]    Medical necessity for a hearing aid due ad/or balance treatment/evaluation for the following reasons: Communication difficulty/ suspicions of roshni

## 2019-06-20 ENCOUNTER — TELEPHONE (OUTPATIENT)
Dept: INTERNAL MEDICINE CLINIC | Facility: CLINIC | Age: 74
End: 2019-06-20

## 2019-06-20 NOTE — TELEPHONE ENCOUNTER
Pt was wondering if she had a \"total T-score\" number for her last Dexa results. Explained to pt that there is no total T-score, rather T-scores are broken down by bone region as lumbar, hip and femoral neck. Provided those results to pt.      Pt state

## 2019-06-20 NOTE — TELEPHONE ENCOUNTER
Patient calling in, requesting to speak with the Nurse to discuss her bone density results from July. Please call pt when time permits.

## 2019-06-20 NOTE — TELEPHONE ENCOUNTER
1. I am glad she started alendronate. However, she NEEDS to be taking vitamin D3 2000 units EVERY day and she CAN continue with her calcium supplement. 2. She is due for her labs in July, 2019.  Please refer her to the after visit summary I provided her in

## 2019-06-21 DIAGNOSIS — I25.10 CORONARY ARTERIOSCLEROSIS: Primary | ICD-10-CM

## 2019-06-21 DIAGNOSIS — I50.1 LEFT HEART FAILURE (CMD): ICD-10-CM

## 2019-07-23 ENCOUNTER — HOSPITAL ENCOUNTER (OUTPATIENT)
Dept: CV DIAGNOSTICS | Age: 74
Discharge: HOME | End: 2019-07-23
Attending: INTERNAL MEDICINE
Payer: MEDICARE

## 2019-07-23 ENCOUNTER — LAB ENCOUNTER (OUTPATIENT)
Dept: LAB | Age: 74
End: 2019-07-23
Attending: INTERNAL MEDICINE
Payer: MEDICARE

## 2019-07-23 DIAGNOSIS — I50.1 LEFT HEART FAILURE (HCC): ICD-10-CM

## 2019-07-23 DIAGNOSIS — Z00.00 ROUTINE GENERAL MEDICAL EXAMINATION AT A HEALTH CARE FACILITY: ICD-10-CM

## 2019-07-23 DIAGNOSIS — I25.10 CORONARY ATHEROSCLEROSIS OF NATIVE CORONARY ARTERY: Primary | ICD-10-CM

## 2019-07-23 DIAGNOSIS — E03.9 MYXEDEMA HEART DISEASE: ICD-10-CM

## 2019-07-23 DIAGNOSIS — E78.00 PURE HYPERCHOLESTEROLEMIA: ICD-10-CM

## 2019-07-23 DIAGNOSIS — E03.9 HYPOTHYROIDISM, UNSPECIFIED TYPE: ICD-10-CM

## 2019-07-23 DIAGNOSIS — I25.10 CORONARY ARTERIOSCLEROSIS: ICD-10-CM

## 2019-07-23 DIAGNOSIS — I51.9 MYXEDEMA HEART DISEASE: ICD-10-CM

## 2019-07-23 DIAGNOSIS — E78.2 MIXED HYPERLIPIDEMIA: ICD-10-CM

## 2019-07-23 DIAGNOSIS — Z13.31 SCREENING FOR DEPRESSION: ICD-10-CM

## 2019-07-23 LAB
ALBUMIN SERPL-MCNC: 4 G/DL (ref 3.4–5)
ALBUMIN/GLOB SERPL: 1.3 {RATIO} (ref 1–2)
ALP LIVER SERPL-CCNC: 75 U/L (ref 55–142)
ALT SERPL-CCNC: 39 U/L (ref 13–56)
ANION GAP SERPL CALC-SCNC: 5 MMOL/L (ref 0–18)
AST SERPL-CCNC: 21 U/L (ref 15–37)
BILIRUB SERPL-MCNC: 0.6 MG/DL (ref 0.1–2)
BUN BLD-MCNC: 15 MG/DL (ref 7–18)
BUN/CREAT SERPL: 14.9 (ref 10–20)
CALCIUM BLD-MCNC: 9.1 MG/DL (ref 8.5–10.1)
CHLORIDE SERPL-SCNC: 107 MMOL/L (ref 98–112)
CHOLEST SMN-MCNC: 140 MG/DL (ref ?–200)
CO2 SERPL-SCNC: 26 MMOL/L (ref 21–32)
CREAT BLD-MCNC: 1.01 MG/DL (ref 0.55–1.02)
GLOBULIN PLAS-MCNC: 3.1 G/DL (ref 2.8–4.4)
GLUCOSE BLD-MCNC: 105 MG/DL (ref 70–99)
HDLC SERPL-MCNC: 49 MG/DL (ref 40–59)
LDLC SERPL CALC-MCNC: 62 MG/DL (ref ?–100)
M PROTEIN MFR SERPL ELPH: 7.1 G/DL (ref 6.4–8.2)
NONHDLC SERPL-MCNC: 91 MG/DL (ref ?–130)
OSMOLALITY SERPL CALC.SUM OF ELEC: 287 MOSM/KG (ref 275–295)
POTASSIUM SERPL-SCNC: 4 MMOL/L (ref 3.5–5.1)
SODIUM SERPL-SCNC: 138 MMOL/L (ref 136–145)
T4 FREE SERPL-MCNC: 1.2 NG/DL (ref 0.8–1.7)
TRIGL SERPL-MCNC: 147 MG/DL (ref 30–149)
TSI SER-ACNC: 4.5 MIU/ML (ref 0.36–3.74)
VLDLC SERPL CALC-MCNC: 29 MG/DL (ref 0–30)

## 2019-07-23 PROCEDURE — 84443 ASSAY THYROID STIM HORMONE: CPT

## 2019-07-23 PROCEDURE — 80053 COMPREHEN METABOLIC PANEL: CPT

## 2019-07-23 PROCEDURE — 36415 COLL VENOUS BLD VENIPUNCTURE: CPT

## 2019-07-23 PROCEDURE — 93306 TTE W/DOPPLER COMPLETE: CPT | Performed by: INTERNAL MEDICINE

## 2019-07-23 PROCEDURE — 80061 LIPID PANEL: CPT

## 2019-07-23 PROCEDURE — 84439 ASSAY OF FREE THYROXINE: CPT

## 2019-07-24 ENCOUNTER — TELEPHONE (OUTPATIENT)
Dept: CARDIOLOGY | Age: 74
End: 2019-07-24

## 2019-07-24 DIAGNOSIS — E11.9 TYPE 2 DIABETES MELLITUS WITHOUT COMPLICATION, WITHOUT LONG-TERM CURRENT USE OF INSULIN (HCC): Primary | ICD-10-CM

## 2019-07-26 ENCOUNTER — TELEPHONE (OUTPATIENT)
Dept: CARDIOLOGY | Age: 74
End: 2019-07-26

## 2019-07-30 ENCOUNTER — OFFICE VISIT (OUTPATIENT)
Dept: CARDIOLOGY | Age: 74
End: 2019-07-30

## 2019-07-30 VITALS
DIASTOLIC BLOOD PRESSURE: 82 MMHG | WEIGHT: 165 LBS | SYSTOLIC BLOOD PRESSURE: 155 MMHG | BODY MASS INDEX: 26.52 KG/M2 | HEIGHT: 66 IN | HEART RATE: 58 BPM

## 2019-07-30 DIAGNOSIS — I25.10 CORONARY ARTERY DISEASE INVOLVING NATIVE HEART WITHOUT ANGINA PECTORIS, UNSPECIFIED VESSEL OR LESION TYPE: ICD-10-CM

## 2019-07-30 DIAGNOSIS — I65.23 ASYMPTOMATIC CAROTID ARTERY STENOSIS, BILATERAL: Primary | ICD-10-CM

## 2019-07-30 DIAGNOSIS — Z95.5 S/P PRIMARY ANGIOPLASTY WITH CORONARY STENT: ICD-10-CM

## 2019-07-30 DIAGNOSIS — I50.20 SYSTOLIC CONGESTIVE HEART FAILURE, UNSPECIFIED HF CHRONICITY (CMD): ICD-10-CM

## 2019-07-30 DIAGNOSIS — E78.00 PURE HYPERCHOLESTEROLEMIA: ICD-10-CM

## 2019-07-30 DIAGNOSIS — E03.9 HYPOTHYROIDISM, UNSPECIFIED TYPE: ICD-10-CM

## 2019-07-30 PROCEDURE — 99214 OFFICE O/P EST MOD 30 MIN: CPT | Performed by: INTERNAL MEDICINE

## 2019-07-30 SDOH — HEALTH STABILITY: PHYSICAL HEALTH: ON AVERAGE, HOW MANY MINUTES DO YOU ENGAGE IN EXERCISE AT THIS LEVEL?: 0 MIN

## 2019-07-30 SDOH — HEALTH STABILITY: PHYSICAL HEALTH: ON AVERAGE, HOW MANY DAYS PER WEEK DO YOU ENGAGE IN MODERATE TO STRENUOUS EXERCISE (LIKE A BRISK WALK)?: 0 DAYS

## 2019-08-21 ENCOUNTER — TELEPHONE (OUTPATIENT)
Dept: INTERNAL MEDICINE CLINIC | Facility: CLINIC | Age: 74
End: 2019-08-21

## 2019-08-21 NOTE — TELEPHONE ENCOUNTER
Notified pt that we administer TDAP in office or she can received form loc[facility] pharmacy. Pt verbalized understanding and stated she will go to pharmacy.

## 2019-08-22 ENCOUNTER — TELEPHONE (OUTPATIENT)
Dept: INTERNAL MEDICINE CLINIC | Facility: CLINIC | Age: 74
End: 2019-08-22

## 2019-08-22 NOTE — TELEPHONE ENCOUNTER
Yesterday patient went to [place] and received her whooping cough booster and would like it updated in her records, Any questions please call her.

## 2019-10-21 ENCOUNTER — APPOINTMENT (OUTPATIENT)
Dept: LAB | Age: 74
End: 2019-10-21
Attending: INTERNAL MEDICINE
Payer: MEDICARE

## 2019-10-21 DIAGNOSIS — E11.9 TYPE 2 DIABETES MELLITUS WITHOUT COMPLICATION, WITHOUT LONG-TERM CURRENT USE OF INSULIN (HCC): ICD-10-CM

## 2019-10-21 DIAGNOSIS — E03.8 OTHER SPECIFIED HYPOTHYROIDISM: Primary | ICD-10-CM

## 2019-10-21 PROCEDURE — 84443 ASSAY THYROID STIM HORMONE: CPT

## 2019-10-21 PROCEDURE — 84439 ASSAY OF FREE THYROXINE: CPT

## 2019-10-21 PROCEDURE — 36415 COLL VENOUS BLD VENIPUNCTURE: CPT

## 2019-10-21 RX ORDER — LEVOTHYROXINE SODIUM 88 UG/1
88 TABLET ORAL
Qty: 90 TABLET | Refills: 0 | Status: SHIPPED | OUTPATIENT
Start: 2019-10-21 | End: 2019-12-17

## 2019-11-22 ENCOUNTER — TELEPHONE (OUTPATIENT)
Dept: CARDIOLOGY | Age: 74
End: 2019-11-22

## 2019-12-16 ENCOUNTER — APPOINTMENT (OUTPATIENT)
Dept: LAB | Age: 74
End: 2019-12-16
Attending: INTERNAL MEDICINE
Payer: MEDICARE

## 2019-12-16 DIAGNOSIS — E03.8 OTHER SPECIFIED HYPOTHYROIDISM: ICD-10-CM

## 2019-12-16 PROCEDURE — 84443 ASSAY THYROID STIM HORMONE: CPT

## 2019-12-16 PROCEDURE — 84439 ASSAY OF FREE THYROXINE: CPT

## 2019-12-16 PROCEDURE — 36415 COLL VENOUS BLD VENIPUNCTURE: CPT

## 2019-12-17 DIAGNOSIS — E03.8 OTHER SPECIFIED HYPOTHYROIDISM: ICD-10-CM

## 2019-12-17 RX ORDER — LEVOTHYROXINE SODIUM 88 UG/1
88 TABLET ORAL
Qty: 90 TABLET | Refills: 0 | Status: SHIPPED | OUTPATIENT
Start: 2019-12-17 | End: 2020-02-26

## 2019-12-17 NOTE — TELEPHONE ENCOUNTER
Levothyroxine Sodium 88 MCG Oral Tab    Last OV relevant to medication: 6-    Last refill date: 10- # 90 tabs with 0 refills     When pt was asked to return for OV:1 year    Upcoming appt/reason: none    Labs: 7/23/2019       Your thyroid fun

## 2020-01-22 ENCOUNTER — PATIENT OUTREACH (OUTPATIENT)
Dept: CASE MANAGEMENT | Age: 75
End: 2020-01-22

## 2020-01-31 ENCOUNTER — TELEPHONE (OUTPATIENT)
Dept: CARDIOLOGY | Age: 75
End: 2020-01-31

## 2020-02-11 ENCOUNTER — LAB ENCOUNTER (OUTPATIENT)
Dept: LAB | Age: 75
End: 2020-02-11
Attending: INTERNAL MEDICINE
Payer: MEDICARE

## 2020-02-11 DIAGNOSIS — E03.9 MYXEDEMA HEART DISEASE: ICD-10-CM

## 2020-02-11 DIAGNOSIS — I51.9 MYXEDEMA HEART DISEASE: ICD-10-CM

## 2020-02-11 DIAGNOSIS — E78.00 PURE HYPERCHOLESTEROLEMIA: ICD-10-CM

## 2020-02-11 DIAGNOSIS — I50.1 LEFT HEART FAILURE (HCC): ICD-10-CM

## 2020-02-11 DIAGNOSIS — I25.10 CORONARY ATHEROSCLEROSIS OF NATIVE CORONARY ARTERY: Primary | ICD-10-CM

## 2020-02-11 LAB
ALBUMIN SERPL-MCNC: 3.5 G/DL (ref 3.4–5)
ALBUMIN/GLOB SERPL: 1 {RATIO} (ref 1–2)
ALP LIVER SERPL-CCNC: 82 U/L (ref 55–142)
ALT SERPL-CCNC: 33 U/L (ref 13–56)
ANION GAP SERPL CALC-SCNC: 3 MMOL/L (ref 0–18)
AST SERPL-CCNC: 22 U/L (ref 15–37)
BILIRUB SERPL-MCNC: 0.4 MG/DL (ref 0.1–2)
BUN BLD-MCNC: 14 MG/DL (ref 7–18)
BUN/CREAT SERPL: 15.6 (ref 10–20)
CALCIUM BLD-MCNC: 8.9 MG/DL (ref 8.5–10.1)
CHLORIDE SERPL-SCNC: 111 MMOL/L (ref 98–112)
CHOLEST SMN-MCNC: 157 MG/DL (ref ?–200)
CO2 SERPL-SCNC: 26 MMOL/L (ref 21–32)
CREAT BLD-MCNC: 0.9 MG/DL (ref 0.55–1.02)
GLOBULIN PLAS-MCNC: 3.6 G/DL (ref 2.8–4.4)
GLUCOSE BLD-MCNC: 116 MG/DL (ref 70–99)
HDLC SERPL-MCNC: 53 MG/DL (ref 40–59)
LDLC SERPL CALC-MCNC: 82 MG/DL (ref ?–100)
M PROTEIN MFR SERPL ELPH: 7.1 G/DL (ref 6.4–8.2)
NONHDLC SERPL-MCNC: 104 MG/DL (ref ?–130)
OSMOLALITY SERPL CALC.SUM OF ELEC: 291 MOSM/KG (ref 275–295)
PATIENT FASTING Y/N/NP: YES
PATIENT FASTING Y/N/NP: YES
POTASSIUM SERPL-SCNC: 4.5 MMOL/L (ref 3.5–5.1)
SODIUM SERPL-SCNC: 140 MMOL/L (ref 136–145)
TRIGL SERPL-MCNC: 110 MG/DL (ref 30–149)
TSI SER-ACNC: 1.1 MIU/ML (ref 0.36–3.74)
VLDLC SERPL CALC-MCNC: 22 MG/DL (ref 0–30)

## 2020-02-11 PROCEDURE — 36415 COLL VENOUS BLD VENIPUNCTURE: CPT

## 2020-02-11 PROCEDURE — 84443 ASSAY THYROID STIM HORMONE: CPT

## 2020-02-11 PROCEDURE — 80061 LIPID PANEL: CPT

## 2020-02-11 PROCEDURE — 80053 COMPREHEN METABOLIC PANEL: CPT

## 2020-02-18 ENCOUNTER — OFFICE VISIT (OUTPATIENT)
Dept: CARDIOLOGY | Age: 75
End: 2020-02-18

## 2020-02-18 VITALS
SYSTOLIC BLOOD PRESSURE: 161 MMHG | WEIGHT: 164 LBS | DIASTOLIC BLOOD PRESSURE: 72 MMHG | HEIGHT: 67 IN | HEART RATE: 103 BPM | BODY MASS INDEX: 25.74 KG/M2

## 2020-02-18 DIAGNOSIS — I50.20 SYSTOLIC CONGESTIVE HEART FAILURE, UNSPECIFIED HF CHRONICITY (CMD): ICD-10-CM

## 2020-02-18 DIAGNOSIS — Z95.5 S/P PRIMARY ANGIOPLASTY WITH CORONARY STENT: ICD-10-CM

## 2020-02-18 DIAGNOSIS — I25.10 CORONARY ARTERY DISEASE INVOLVING NATIVE HEART WITHOUT ANGINA PECTORIS, UNSPECIFIED VESSEL OR LESION TYPE: ICD-10-CM

## 2020-02-18 DIAGNOSIS — I65.23 ASYMPTOMATIC CAROTID ARTERY STENOSIS, BILATERAL: Primary | ICD-10-CM

## 2020-02-18 DIAGNOSIS — E78.00 PURE HYPERCHOLESTEROLEMIA: ICD-10-CM

## 2020-02-18 DIAGNOSIS — E03.9 HYPOTHYROIDISM, UNSPECIFIED TYPE: ICD-10-CM

## 2020-02-18 PROCEDURE — 99214 OFFICE O/P EST MOD 30 MIN: CPT | Performed by: INTERNAL MEDICINE

## 2020-02-18 RX ORDER — AMLODIPINE BESYLATE 2.5 MG/1
2.5 TABLET ORAL DAILY
Qty: 30 TABLET | Refills: 3 | Status: SHIPPED | OUTPATIENT
Start: 2020-02-18

## 2020-02-18 SDOH — HEALTH STABILITY: PHYSICAL HEALTH: ON AVERAGE, HOW MANY DAYS PER WEEK DO YOU ENGAGE IN MODERATE TO STRENUOUS EXERCISE (LIKE A BRISK WALK)?: 0 DAYS

## 2020-02-18 SDOH — HEALTH STABILITY: PHYSICAL HEALTH: ON AVERAGE, HOW MANY MINUTES DO YOU ENGAGE IN EXERCISE AT THIS LEVEL?: 0 MIN

## 2020-02-18 ASSESSMENT — PATIENT HEALTH QUESTIONNAIRE - PHQ9
2. FEELING DOWN, DEPRESSED OR HOPELESS: NOT AT ALL
1. LITTLE INTEREST OR PLEASURE IN DOING THINGS: NOT AT ALL
SUM OF ALL RESPONSES TO PHQ9 QUESTIONS 1 AND 2: 0
SUM OF ALL RESPONSES TO PHQ9 QUESTIONS 1 AND 2: 0

## 2020-02-20 ENCOUNTER — TELEPHONE (OUTPATIENT)
Dept: CARDIOLOGY | Age: 75
End: 2020-02-20

## 2020-02-24 DIAGNOSIS — E03.8 OTHER SPECIFIED HYPOTHYROIDISM: ICD-10-CM

## 2020-02-25 NOTE — TELEPHONE ENCOUNTER
Last OV: 6/14/19 with Dr. Dior Held  Last refill date: 12/17/19     #/refills: #90, 0 refills  When pt was asked to return for OV: 1 year  Upcoming appt/reason: no upcoming appt  Last labs 2/11/2020

## 2020-02-26 RX ORDER — LEVOTHYROXINE SODIUM 88 UG/1
88 TABLET ORAL
Qty: 90 TABLET | Refills: 1 | Status: SHIPPED | OUTPATIENT
Start: 2020-02-26 | End: 2020-07-06

## 2020-03-23 ENCOUNTER — TELEPHONE (OUTPATIENT)
Dept: CARDIOLOGY | Age: 75
End: 2020-03-23

## 2020-03-27 ENCOUNTER — TELEPHONE (OUTPATIENT)
Dept: INTERNAL MEDICINE CLINIC | Facility: CLINIC | Age: 75
End: 2020-03-27

## 2020-03-27 DIAGNOSIS — R35.0 URINARY FREQUENCY: ICD-10-CM

## 2020-03-27 DIAGNOSIS — R30.0 DYSURIA: Primary | ICD-10-CM

## 2020-03-27 PROCEDURE — G2012 BRIEF CHECK IN BY MD/QHP: HCPCS | Performed by: INTERNAL MEDICINE

## 2020-03-27 RX ORDER — CIPROFLOXACIN 500 MG/1
500 TABLET, FILM COATED ORAL 2 TIMES DAILY
Qty: 6 TABLET | Refills: 0 | Status: SHIPPED | OUTPATIENT
Start: 2020-03-27 | End: 2020-09-01

## 2020-03-27 NOTE — TELEPHONE ENCOUNTER
Patient called and stated that she has a possible UTI. She would like an antibiotic called into the pharmacy. Needs to be sent to [facility] in [address], [place], [address] [phone]. Please advise.

## 2020-03-27 NOTE — TELEPHONE ENCOUNTER
Virtual/Telephone Check-In    Rene Gorman verbally consents to   a Virtual/Telephone Check-In service on 03/27/20. Patient understands and accepts financial responsibility for any deductible, co-insurance and/or co-pays associated with this service.

## 2020-07-03 ENCOUNTER — TELEPHONE (OUTPATIENT)
Dept: INTERNAL MEDICINE CLINIC | Facility: CLINIC | Age: 75
End: 2020-07-03

## 2020-07-03 DIAGNOSIS — E03.8 OTHER SPECIFIED HYPOTHYROIDISM: ICD-10-CM

## 2020-07-06 RX ORDER — LEVOTHYROXINE SODIUM 88 UG/1
88 TABLET ORAL
Qty: 90 TABLET | Refills: 0 | Status: SHIPPED | OUTPATIENT
Start: 2020-07-06 | End: 2020-09-18

## 2020-07-06 NOTE — TELEPHONE ENCOUNTER
Refill requested:   Requested Prescriptions     Pending Prescriptions Disp Refills   • LEVOTHYROXINE SODIUM 88 MCG Oral Tab [Pharmacy Med Name: LEVOTHYROXINE SODIUM 88 MCG Tablet] 90 tablet 1     Sig: TAKE 1 TABLET (88 MCG TOTAL) BY MOUTH BEFORE BREAKFAST.

## 2020-07-13 ENCOUNTER — TELEPHONE (OUTPATIENT)
Dept: INTERNAL MEDICINE CLINIC | Facility: CLINIC | Age: 75
End: 2020-07-13

## 2020-07-13 NOTE — TELEPHONE ENCOUNTER
Patient stated that she moved to [facility] and stated that Dr. Danielle Elena is too far. She would like Dr. Danielle Elena to recommend someone for her. Please advise.

## 2020-07-13 NOTE — TELEPHONE ENCOUNTER
My , Adelfo Thomas MD, works for [employer] and is at the [employer]. He is about 5 miles from her home. He also works with 2 female providers.

## 2020-07-21 NOTE — TELEPHONE ENCOUNTER
Patient has chronic insomnia with snoring, obesity, excessive daytime fatigue, hypertension.  She is never been tested for sleep apnea doing referral for sleep study.   Unfortunately, I do not know anyone affiliated with [facility].

## 2020-07-21 NOTE — TELEPHONE ENCOUNTER
Spoke with patient and gave her the information below.  Stated that she needs to see someone affiliated with [facility] since that will now be closest to her

## 2020-09-01 ENCOUNTER — TELEPHONE (OUTPATIENT)
Dept: INTERNAL MEDICINE CLINIC | Facility: CLINIC | Age: 75
End: 2020-09-01

## 2020-09-01 ENCOUNTER — OFFICE VISIT (OUTPATIENT)
Dept: INTERNAL MEDICINE CLINIC | Facility: CLINIC | Age: 75
End: 2020-09-01
Payer: MEDICARE

## 2020-09-01 VITALS
DIASTOLIC BLOOD PRESSURE: 84 MMHG | OXYGEN SATURATION: 98 % | HEART RATE: 64 BPM | SYSTOLIC BLOOD PRESSURE: 128 MMHG | BODY MASS INDEX: 26.84 KG/M2 | TEMPERATURE: 98 F | RESPIRATION RATE: 16 BRPM | HEIGHT: 66 IN | WEIGHT: 167 LBS

## 2020-09-01 DIAGNOSIS — E11.9 TYPE 2 DIABETES MELLITUS WITHOUT COMPLICATION, WITHOUT LONG-TERM CURRENT USE OF INSULIN (HCC): ICD-10-CM

## 2020-09-01 DIAGNOSIS — Z13.31 SCREENING FOR DEPRESSION: ICD-10-CM

## 2020-09-01 DIAGNOSIS — M81.0 AGE-RELATED OSTEOPOROSIS WITHOUT CURRENT PATHOLOGICAL FRACTURE: ICD-10-CM

## 2020-09-01 DIAGNOSIS — Z00.00 ROUTINE GENERAL MEDICAL EXAMINATION AT A HEALTH CARE FACILITY: Primary | ICD-10-CM

## 2020-09-01 DIAGNOSIS — I25.10 ATHEROSCLEROSIS OF NATIVE CORONARY ARTERY OF NATIVE HEART WITHOUT ANGINA PECTORIS: ICD-10-CM

## 2020-09-01 DIAGNOSIS — Z12.11 SCREEN FOR COLON CANCER: ICD-10-CM

## 2020-09-01 DIAGNOSIS — E78.2 MIXED HYPERLIPIDEMIA: ICD-10-CM

## 2020-09-01 DIAGNOSIS — E03.8 OTHER SPECIFIED HYPOTHYROIDISM: ICD-10-CM

## 2020-09-01 PROCEDURE — G0444 DEPRESSION SCREEN ANNUAL: HCPCS | Performed by: INTERNAL MEDICINE

## 2020-09-01 PROCEDURE — G0439 PPPS, SUBSEQ VISIT: HCPCS | Performed by: INTERNAL MEDICINE

## 2020-09-01 PROCEDURE — 99214 OFFICE O/P EST MOD 30 MIN: CPT | Performed by: INTERNAL MEDICINE

## 2020-09-01 NOTE — PROGRESS NOTES
Kumar Mohan is a 66year old female. HPI:   Patient presents for medicare wellness exam and additional issues noted below. 1. Elevated blood pressure: states it is always high with Dr. Denzel Alexander so she prescribed norvasc to use \"as needed. \" however, Other (Autoimmune disease) Other         fam hx   • Heart Disease Other         fam hx   • Other (Other) Self 1973        cervical cancer ( carcinoma of insitu      Past Medical History:   Diagnosis Date   • Endocrine disorder    • History of PTCA 2007 eye exam on 3/12/2020 by Robb Keller does not show diabetic retinopathy.     - Foot Exam: 9/2020, examine both feet every night.   - LDL: on repatha  - BP: controlled  - micro alb:creat < 30 in 1/2019, re-ordered  - Depression Screen: Done 9/2020  - on [facility]  Cardiology - [facility]     ADDENDUM on 9/18/2020  DM Eye Exam by Manoj Zepeda on 3/12/2020: no diabetic retinopathy b/l. The patient indicates understanding of these issues and agrees to the plan.   The patient is asked to return in 1 y

## 2020-09-01 NOTE — PATIENT INSTRUCTIONS
Please call central scheduling at [phone] to schedule your labs as soon as possible. I have ordered a reclast injection for your osteoporosis. This injection is given through the [facility].  If you do not hear from them in 2 weeks, ple

## 2020-09-01 NOTE — TELEPHONE ENCOUNTER
Reclast infusion therapy order faxed to [facility] in [place].

## 2020-09-03 ENCOUNTER — TELEPHONE (OUTPATIENT)
Dept: HEMATOLOGY/ONCOLOGY | Age: 75
End: 2020-09-03

## 2020-09-03 ENCOUNTER — LAB ENCOUNTER (OUTPATIENT)
Dept: LAB | Age: 75
End: 2020-09-03
Attending: INTERNAL MEDICINE
Payer: MEDICARE

## 2020-09-03 DIAGNOSIS — M81.0 AGE-RELATED OSTEOPOROSIS WITHOUT CURRENT PATHOLOGICAL FRACTURE: ICD-10-CM

## 2020-09-03 DIAGNOSIS — E11.9 TYPE 2 DIABETES MELLITUS WITHOUT COMPLICATION, WITHOUT LONG-TERM CURRENT USE OF INSULIN (HCC): ICD-10-CM

## 2020-09-03 DIAGNOSIS — E03.8 OTHER SPECIFIED HYPOTHYROIDISM: ICD-10-CM

## 2020-09-03 DIAGNOSIS — Z00.00 ROUTINE GENERAL MEDICAL EXAMINATION AT A HEALTH CARE FACILITY: ICD-10-CM

## 2020-09-03 DIAGNOSIS — E78.2 MIXED HYPERLIPIDEMIA: ICD-10-CM

## 2020-09-03 DIAGNOSIS — M81.0 SENILE OSTEOPOROSIS: Primary | ICD-10-CM

## 2020-09-03 DIAGNOSIS — Z13.31 SCREENING FOR DEPRESSION: ICD-10-CM

## 2020-09-03 DIAGNOSIS — I25.10 ATHEROSCLEROSIS OF NATIVE CORONARY ARTERY OF NATIVE HEART WITHOUT ANGINA PECTORIS: ICD-10-CM

## 2020-09-03 LAB
ALT SERPL-CCNC: 40 U/L (ref 13–56)
ANION GAP SERPL CALC-SCNC: 6 MMOL/L (ref 0–18)
AST SERPL-CCNC: 30 U/L (ref 15–37)
BUN BLD-MCNC: 11 MG/DL (ref 7–18)
BUN/CREAT SERPL: 11.7 (ref 10–20)
CALCIUM BLD-MCNC: 9.5 MG/DL (ref 8.5–10.1)
CHLORIDE SERPL-SCNC: 108 MMOL/L (ref 98–112)
CHOLEST SMN-MCNC: 170 MG/DL (ref ?–200)
CO2 SERPL-SCNC: 26 MMOL/L (ref 21–32)
CREAT BLD-MCNC: 0.94 MG/DL (ref 0.55–1.02)
CREAT UR-SCNC: 150 MG/DL
EST. AVERAGE GLUCOSE BLD GHB EST-MCNC: 151 MG/DL (ref 68–126)
GLUCOSE BLD-MCNC: 127 MG/DL (ref 70–99)
HBA1C MFR BLD HPLC: 6.9 % (ref ?–5.7)
HDLC SERPL-MCNC: 56 MG/DL (ref 40–59)
LDLC SERPL CALC-MCNC: 87 MG/DL (ref ?–100)
MICROALBUMIN UR-MCNC: 1.23 MG/DL
MICROALBUMIN/CREAT 24H UR-RTO: 8.2 UG/MG (ref ?–30)
NONHDLC SERPL-MCNC: 114 MG/DL (ref ?–130)
OSMOLALITY SERPL CALC.SUM OF ELEC: 291 MOSM/KG (ref 275–295)
PATIENT FASTING Y/N/NP: YES
PATIENT FASTING Y/N/NP: YES
POTASSIUM SERPL-SCNC: 4.8 MMOL/L (ref 3.5–5.1)
SODIUM SERPL-SCNC: 140 MMOL/L (ref 136–145)
T4 FREE SERPL-MCNC: 1.3 NG/DL (ref 0.8–1.7)
TRIGL SERPL-MCNC: 137 MG/DL (ref 30–149)
TSI SER-ACNC: 2.42 MIU/ML (ref 0.36–3.74)
VIT D+METAB SERPL-MCNC: 35.4 NG/ML (ref 30–100)
VLDLC SERPL CALC-MCNC: 27 MG/DL (ref 0–30)

## 2020-09-03 PROCEDURE — 82043 UR ALBUMIN QUANTITATIVE: CPT

## 2020-09-03 PROCEDURE — 84450 TRANSFERASE (AST) (SGOT): CPT

## 2020-09-03 PROCEDURE — 80048 BASIC METABOLIC PNL TOTAL CA: CPT

## 2020-09-03 PROCEDURE — 80061 LIPID PANEL: CPT

## 2020-09-03 PROCEDURE — 84460 ALANINE AMINO (ALT) (SGPT): CPT

## 2020-09-03 PROCEDURE — 83036 HEMOGLOBIN GLYCOSYLATED A1C: CPT

## 2020-09-03 PROCEDURE — 82570 ASSAY OF URINE CREATININE: CPT

## 2020-09-03 PROCEDURE — 82306 VITAMIN D 25 HYDROXY: CPT

## 2020-09-03 PROCEDURE — 36415 COLL VENOUS BLD VENIPUNCTURE: CPT

## 2020-09-03 PROCEDURE — 84443 ASSAY THYROID STIM HORMONE: CPT

## 2020-09-03 PROCEDURE — 84439 ASSAY OF FREE THYROXINE: CPT

## 2020-09-03 RX ORDER — ZOLEDRONIC ACID 5 MG/100ML
5 INJECTION, SOLUTION INTRAVENOUS ONCE
Status: CANCELLED | OUTPATIENT
Start: 2020-09-03

## 2020-09-08 ENCOUNTER — TELEPHONE (OUTPATIENT)
Dept: INTERNAL MEDICINE CLINIC | Facility: CLINIC | Age: 75
End: 2020-09-08

## 2020-09-08 DIAGNOSIS — E11.9 TYPE 2 DIABETES MELLITUS WITHOUT COMPLICATION, WITHOUT LONG-TERM CURRENT USE OF INSULIN (HCC): Primary | ICD-10-CM

## 2020-09-08 NOTE — TELEPHONE ENCOUNTER
Pt contacted to inform her that referral has been placed. Pt states that she is not in need of referral, but was trying to update us that she had dilated eye exam 3/12/20. I explained to pt that we did not receive a copy of exam. I explained to pt that I will contact Dr Frankie Munoz office for copy. Dr Frankie Munoz office contacted, I spoke with Basilia Mike and requested eye exam to be faxed to [phone]. Awaiting fax.

## 2020-09-14 ENCOUNTER — TELEPHONE (OUTPATIENT)
Dept: INTERNAL MEDICINE CLINIC | Facility: CLINIC | Age: 75
End: 2020-09-14

## 2020-09-14 NOTE — TELEPHONE ENCOUNTER
Patient sent \"Appointment request\" message via [employer] to update her records:    Senior high dose flu shot at [facility] in [place]. .9/12/20      Also I had the dilated retinal eye exam 3/12/20 by Dr Mabel Lawler at [facility], in [place].      Raquel

## 2020-09-14 NOTE — TELEPHONE ENCOUNTER
Influenza vaccine updated. LMTCB to inform pt that we will need a copy of eye exam to update records.

## 2020-09-14 NOTE — TELEPHONE ENCOUNTER
Relayed message to pt.  Pt said she dose not have a copy for the eye exam but she is not going to worry about it

## 2020-09-14 NOTE — TELEPHONE ENCOUNTER
rupali contacted     Dr Cartagena Press at [facility], in [place].     Will send eye exam

## 2020-09-16 DIAGNOSIS — E03.8 OTHER SPECIFIED HYPOTHYROIDISM: ICD-10-CM

## 2020-09-18 RX ORDER — LEVOTHYROXINE SODIUM 88 UG/1
88 TABLET ORAL
Qty: 90 TABLET | Refills: 0 | Status: SHIPPED | OUTPATIENT
Start: 2020-09-18 | End: 2020-11-22

## 2020-09-18 NOTE — TELEPHONE ENCOUNTER
Passed protocol     Last refill:  7/6/2020 Levothyroxine 88 mcg #90 NR    Last TSH - 9/3/2020    LOV:   9/1/2020 Dr Eloy Knapp RTC 1 year  # Hypothryoid: well controlled on synthroid, normal TSH 12/2019  No FOV scheduled

## 2020-11-20 DIAGNOSIS — E03.8 OTHER SPECIFIED HYPOTHYROIDISM: ICD-10-CM

## 2020-11-22 RX ORDER — LEVOTHYROXINE SODIUM 88 UG/1
88 TABLET ORAL
Qty: 90 TABLET | Refills: 3 | Status: SHIPPED | OUTPATIENT
Start: 2020-11-22 | End: 2022-01-04

## 2020-12-01 ENCOUNTER — MED REC SCAN ONLY (OUTPATIENT)
Dept: INTERNAL MEDICINE CLINIC | Facility: CLINIC | Age: 75
End: 2020-12-01

## 2021-01-27 DIAGNOSIS — Z23 NEED FOR VACCINATION: ICD-10-CM

## 2021-02-01 ENCOUNTER — IMMUNIZATION (OUTPATIENT)
Dept: LAB | Age: 76
End: 2021-02-01
Attending: HOSPITALIST
Payer: MEDICARE

## 2021-02-01 DIAGNOSIS — Z23 NEED FOR VACCINATION: Primary | ICD-10-CM

## 2021-02-01 PROCEDURE — 0001A SARSCOV2 VAC 30MCG/0.3ML IM: CPT

## 2021-02-09 ENCOUNTER — LAB ENCOUNTER (OUTPATIENT)
Dept: LAB | Age: 76
End: 2021-02-09
Attending: INTERNAL MEDICINE
Payer: MEDICARE

## 2021-02-09 DIAGNOSIS — Z12.11 SCREEN FOR COLON CANCER: ICD-10-CM

## 2021-02-09 PROCEDURE — 82274 ASSAY TEST FOR BLOOD FECAL: CPT

## 2021-02-20 LAB — HEMOCCULT STL QL: NEGATIVE

## 2021-02-22 ENCOUNTER — IMMUNIZATION (OUTPATIENT)
Dept: LAB | Age: 76
End: 2021-02-22
Attending: HOSPITALIST
Payer: MEDICARE

## 2021-02-22 DIAGNOSIS — Z23 NEED FOR VACCINATION: Primary | ICD-10-CM

## 2021-02-22 PROCEDURE — 0002A SARSCOV2 VAC 30MCG/0.3ML IM: CPT

## 2021-04-29 ENCOUNTER — TELEPHONE (OUTPATIENT)
Dept: INTERNAL MEDICINE CLINIC | Facility: CLINIC | Age: 76
End: 2021-04-29

## 2021-04-29 DIAGNOSIS — M81.0 AGE-RELATED OSTEOPOROSIS WITHOUT CURRENT PATHOLOGICAL FRACTURE: ICD-10-CM

## 2021-04-29 DIAGNOSIS — E11.9 TYPE 2 DIABETES MELLITUS WITHOUT COMPLICATION, WITHOUT LONG-TERM CURRENT USE OF INSULIN (HCC): ICD-10-CM

## 2021-04-29 DIAGNOSIS — E78.2 MIXED HYPERLIPIDEMIA: ICD-10-CM

## 2021-04-29 DIAGNOSIS — E03.8 OTHER SPECIFIED HYPOTHYROIDISM: ICD-10-CM

## 2021-04-29 DIAGNOSIS — Z00.00 ROUTINE GENERAL MEDICAL EXAMINATION AT A HEALTH CARE FACILITY: Primary | ICD-10-CM

## 2021-04-29 NOTE — TELEPHONE ENCOUNTER
Patient is requesting an order for hemoglobin A1C. Patient has an upcoming appointment on 09-08 for her medicare annual well visit. Patient can go to an [facility] lab.

## 2021-08-09 ENCOUNTER — OFFICE VISIT (OUTPATIENT)
Dept: INTERNAL MEDICINE CLINIC | Facility: CLINIC | Age: 76
End: 2021-08-09
Payer: MEDICARE

## 2021-08-09 ENCOUNTER — TELEPHONE (OUTPATIENT)
Dept: INTERNAL MEDICINE CLINIC | Facility: CLINIC | Age: 76
End: 2021-08-09

## 2021-08-09 VITALS
OXYGEN SATURATION: 99 % | TEMPERATURE: 99 F | DIASTOLIC BLOOD PRESSURE: 60 MMHG | SYSTOLIC BLOOD PRESSURE: 120 MMHG | WEIGHT: 160 LBS | HEART RATE: 70 BPM | BODY MASS INDEX: 25.71 KG/M2 | RESPIRATION RATE: 16 BRPM | HEIGHT: 66 IN

## 2021-08-09 DIAGNOSIS — R35.0 URINARY FREQUENCY: ICD-10-CM

## 2021-08-09 DIAGNOSIS — E11.9 TYPE 2 DIABETES MELLITUS WITHOUT COMPLICATION, WITHOUT LONG-TERM CURRENT USE OF INSULIN (HCC): ICD-10-CM

## 2021-08-09 DIAGNOSIS — I15.2 HYPERTENSION ASSOCIATED WITH TYPE 2 DIABETES MELLITUS (HCC): ICD-10-CM

## 2021-08-09 DIAGNOSIS — R30.0 DYSURIA: Primary | ICD-10-CM

## 2021-08-09 DIAGNOSIS — E11.69 HYPERLIPIDEMIA ASSOCIATED WITH TYPE 2 DIABETES MELLITUS (HCC): ICD-10-CM

## 2021-08-09 DIAGNOSIS — E03.8 OTHER SPECIFIED HYPOTHYROIDISM: ICD-10-CM

## 2021-08-09 DIAGNOSIS — I50.22 CHRONIC SYSTOLIC CONGESTIVE HEART FAILURE (HCC): ICD-10-CM

## 2021-08-09 DIAGNOSIS — E11.59 HYPERTENSION ASSOCIATED WITH TYPE 2 DIABETES MELLITUS (HCC): ICD-10-CM

## 2021-08-09 DIAGNOSIS — E78.5 HYPERLIPIDEMIA ASSOCIATED WITH TYPE 2 DIABETES MELLITUS (HCC): ICD-10-CM

## 2021-08-09 DIAGNOSIS — Z95.5 S/P PRIMARY ANGIOPLASTY WITH CORONARY STENT: ICD-10-CM

## 2021-08-09 DIAGNOSIS — R31.0 GROSS HEMATURIA: ICD-10-CM

## 2021-08-09 DIAGNOSIS — M81.0 AGE-RELATED OSTEOPOROSIS WITHOUT CURRENT PATHOLOGICAL FRACTURE: ICD-10-CM

## 2021-08-09 DIAGNOSIS — I50.1 LEFT HEART FAILURE (HCC): ICD-10-CM

## 2021-08-09 PROCEDURE — 99214 OFFICE O/P EST MOD 30 MIN: CPT | Performed by: INTERNAL MEDICINE

## 2021-08-09 PROCEDURE — 87086 URINE CULTURE/COLONY COUNT: CPT | Performed by: INTERNAL MEDICINE

## 2021-08-09 RX ORDER — CIPROFLOXACIN 250 MG/1
250 TABLET, FILM COATED ORAL 2 TIMES DAILY
Qty: 6 TABLET | Refills: 0 | Status: SHIPPED | OUTPATIENT
Start: 2021-08-09 | End: 2021-11-10

## 2021-08-09 NOTE — PROGRESS NOTES
Cindi Pat is a 64year old female. HPI:   Patient presents for the following issues. She moved closer to [facility] so is struggling to figure out which hospital system she wants to continue care at.    1. Elevated BP: established with lb (72.6 kg)  03/17/21 : 165 lb (74.8 kg)  09/09/20 : 169 lb (76.7 kg)  09/01/20 : 167 lb (75.8 kg)  06/14/19 : 167 lb 4 oz (75.9 kg)  10/16/18 : 165 lb (74.8 kg)        Penicillins             RASH, ITCHING  Statins                     Comment:myalgia  Funes clubbing or edema  Bilateral barefoot skin diabetic exam is normal, visualized feet and the appearance is normal.  Bilateral monofilament/sensation of both feet is normal.  Pulsation pedal pulse exam of both lower legs/feet is normal as well.   No CVA tende weight bearing exercises. Cont fall prevention. # Health Maintenance: medicare wellness exam on 9/1/2020  Stress Management: feels she jonathan well with her stress, does not need assistance.  Has a very strong leana.   Colon Cancer Screening: very informed

## 2021-08-09 NOTE — TELEPHONE ENCOUNTER
Pt states that around 2:00am last [address] [facility] she woke up with urgency to urinate, dysuria, LBP and hematuria. She continued throughout the night with frequency and same s/s. Pt took AZO and 1 tablet of cipro which she had from previous RX.      Does not want t

## 2021-08-09 NOTE — TELEPHONE ENCOUNTER
Patient scheduled.      Future Appointments   Date Time Provider Erick Isabel   8/9/2021  1:00 PM Jose Gomez MD [facility] [place]

## 2021-08-09 NOTE — TELEPHONE ENCOUNTER
Needs office visit. If declines OV then at least needs UA. Order is in. Not appropriate to send antibiotics without urine sample as this may not necessarily be in infection.

## 2021-08-09 NOTE — PATIENT INSTRUCTIONS
You are due for your zolendronic acid ([facility]clast injection) through [facility].  Please call Phone [phone]    I do advise you get the TDAP (tetanus, diptheriae, pertussis) vaccine every 10 years but it can cost up to $65.    I also advise y

## 2021-08-09 NOTE — TELEPHONE ENCOUNTER
Patient stated she wants a prescription for a UTI. Patient stated she knows what a UTI is and she isn't coming in for an appointment or willing to do a uranalysis. Patient stated she needs a prescription asap.      Patient complains of blood in urine and ur

## 2021-08-10 ENCOUNTER — LAB ENCOUNTER (OUTPATIENT)
Dept: LAB | Age: 76
End: 2021-08-10
Attending: INTERNAL MEDICINE
Payer: MEDICARE

## 2021-08-10 ENCOUNTER — TELEPHONE (OUTPATIENT)
Dept: INTERNAL MEDICINE CLINIC | Facility: CLINIC | Age: 76
End: 2021-08-10

## 2021-08-10 DIAGNOSIS — M81.0 AGE-RELATED OSTEOPOROSIS WITHOUT CURRENT PATHOLOGICAL FRACTURE: ICD-10-CM

## 2021-08-10 DIAGNOSIS — E03.8 OTHER SPECIFIED HYPOTHYROIDISM: ICD-10-CM

## 2021-08-10 DIAGNOSIS — R30.0 DYSURIA: ICD-10-CM

## 2021-08-10 DIAGNOSIS — I50.1 LEFT HEART FAILURE (HCC): ICD-10-CM

## 2021-08-10 DIAGNOSIS — E11.9 TYPE 2 DIABETES MELLITUS WITHOUT COMPLICATION, WITHOUT LONG-TERM CURRENT USE OF INSULIN (HCC): ICD-10-CM

## 2021-08-10 DIAGNOSIS — R31.0 GROSS HEMATURIA: ICD-10-CM

## 2021-08-10 DIAGNOSIS — E11.69 HYPERLIPIDEMIA ASSOCIATED WITH TYPE 2 DIABETES MELLITUS (HCC): ICD-10-CM

## 2021-08-10 DIAGNOSIS — E78.5 HYPERLIPIDEMIA ASSOCIATED WITH TYPE 2 DIABETES MELLITUS (HCC): ICD-10-CM

## 2021-08-10 DIAGNOSIS — Z00.00 ROUTINE GENERAL MEDICAL EXAMINATION AT A HEALTH CARE FACILITY: ICD-10-CM

## 2021-08-10 DIAGNOSIS — R35.0 URINARY FREQUENCY: ICD-10-CM

## 2021-08-10 DIAGNOSIS — I50.22 CHRONIC SYSTOLIC CONGESTIVE HEART FAILURE (HCC): ICD-10-CM

## 2021-08-10 LAB
ALT SERPL-CCNC: 42 U/L
ANION GAP SERPL CALC-SCNC: 4 MMOL/L (ref 0–18)
AST SERPL-CCNC: 32 U/L (ref 15–37)
BASOPHILS # BLD AUTO: 0.07 X10(3) UL (ref 0–0.2)
BASOPHILS NFR BLD AUTO: 0.9 %
BILIRUB UR QL STRIP.AUTO: NEGATIVE
BUN BLD-MCNC: 9 MG/DL (ref 7–18)
CALCIUM BLD-MCNC: 8.9 MG/DL (ref 8.5–10.1)
CHLORIDE SERPL-SCNC: 109 MMOL/L (ref 98–112)
CHOLEST SMN-MCNC: 223 MG/DL (ref ?–200)
CLARITY UR REFRACT.AUTO: CLEAR
CO2 SERPL-SCNC: 27 MMOL/L (ref 21–32)
COLOR UR AUTO: YELLOW
CREAT BLD-MCNC: 1 MG/DL
CREAT UR-SCNC: 233 MG/DL
EOSINOPHIL # BLD AUTO: 0.2 X10(3) UL (ref 0–0.7)
EOSINOPHIL NFR BLD AUTO: 2.6 %
ERYTHROCYTE [DISTWIDTH] IN BLOOD BY AUTOMATED COUNT: 12.9 %
EST. AVERAGE GLUCOSE BLD GHB EST-MCNC: 146 MG/DL (ref 68–126)
GLUCOSE BLD-MCNC: 132 MG/DL (ref 70–99)
GLUCOSE UR STRIP.AUTO-MCNC: NEGATIVE MG/DL
HAV IGM SER QL: 2.3 MG/DL (ref 1.6–2.6)
HBA1C MFR BLD HPLC: 6.7 % (ref ?–5.7)
HCT VFR BLD AUTO: 42.1 %
HDLC SERPL-MCNC: 47 MG/DL (ref 40–59)
HGB BLD-MCNC: 13.8 G/DL
IMM GRANULOCYTES # BLD AUTO: 0.03 X10(3) UL (ref 0–1)
IMM GRANULOCYTES NFR BLD: 0.4 %
KETONES UR STRIP.AUTO-MCNC: NEGATIVE MG/DL
LDLC SERPL CALC-MCNC: 154 MG/DL (ref ?–100)
LYMPHOCYTES # BLD AUTO: 1.93 X10(3) UL (ref 1–4)
LYMPHOCYTES NFR BLD AUTO: 25.1 %
MCH RBC QN AUTO: 31.4 PG (ref 26–34)
MCHC RBC AUTO-ENTMCNC: 32.8 G/DL (ref 31–37)
MCV RBC AUTO: 95.7 FL
MICROALBUMIN UR-MCNC: 1.95 MG/DL
MICROALBUMIN/CREAT 24H UR-RTO: 8.4 UG/MG (ref ?–30)
MONOCYTES # BLD AUTO: 0.73 X10(3) UL (ref 0.1–1)
MONOCYTES NFR BLD AUTO: 9.5 %
NEUTROPHILS # BLD AUTO: 4.73 X10 (3) UL (ref 1.5–7.7)
NEUTROPHILS # BLD AUTO: 4.73 X10(3) UL (ref 1.5–7.7)
NEUTROPHILS NFR BLD AUTO: 61.5 %
NITRITE UR QL STRIP.AUTO: NEGATIVE
NONHDLC SERPL-MCNC: 176 MG/DL (ref ?–130)
OSMOLALITY SERPL CALC.SUM OF ELEC: 291 MOSM/KG (ref 275–295)
PATIENT FASTING Y/N/NP: YES
PATIENT FASTING Y/N/NP: YES
PH UR STRIP.AUTO: 5 [PH] (ref 5–8)
PHOSPHATE SERPL-MCNC: 2.5 MG/DL (ref 2.5–4.9)
PLATELET # BLD AUTO: 263 10(3)UL (ref 150–450)
POTASSIUM SERPL-SCNC: 4.2 MMOL/L (ref 3.5–5.1)
PROT UR STRIP.AUTO-MCNC: NEGATIVE MG/DL
RBC # BLD AUTO: 4.4 X10(6)UL
RBC UR QL AUTO: NEGATIVE
SODIUM SERPL-SCNC: 140 MMOL/L (ref 136–145)
SP GR UR STRIP.AUTO: 1.02 (ref 1–1.03)
T4 FREE SERPL-MCNC: 1.3 NG/DL (ref 0.8–1.7)
TRIGL SERPL-MCNC: 122 MG/DL (ref 30–149)
TSI SER-ACNC: 2.75 MIU/ML (ref 0.36–3.74)
UROBILINOGEN UR STRIP.AUTO-MCNC: <2 MG/DL
VIT D+METAB SERPL-MCNC: 35 NG/ML (ref 30–100)
VLDLC SERPL CALC-MCNC: 23 MG/DL (ref 0–30)
WBC # BLD AUTO: 7.7 X10(3) UL (ref 4–11)

## 2021-08-10 PROCEDURE — 82043 UR ALBUMIN QUANTITATIVE: CPT

## 2021-08-10 PROCEDURE — 84460 ALANINE AMINO (ALT) (SGPT): CPT

## 2021-08-10 PROCEDURE — 82570 ASSAY OF URINE CREATININE: CPT

## 2021-08-10 PROCEDURE — 82306 VITAMIN D 25 HYDROXY: CPT

## 2021-08-10 PROCEDURE — 84443 ASSAY THYROID STIM HORMONE: CPT

## 2021-08-10 PROCEDURE — 83735 ASSAY OF MAGNESIUM: CPT

## 2021-08-10 PROCEDURE — 80061 LIPID PANEL: CPT

## 2021-08-10 PROCEDURE — 81001 URINALYSIS AUTO W/SCOPE: CPT

## 2021-08-10 PROCEDURE — 80048 BASIC METABOLIC PNL TOTAL CA: CPT

## 2021-08-10 PROCEDURE — 85025 COMPLETE CBC W/AUTO DIFF WBC: CPT

## 2021-08-10 PROCEDURE — 84450 TRANSFERASE (AST) (SGOT): CPT

## 2021-08-10 PROCEDURE — 36415 COLL VENOUS BLD VENIPUNCTURE: CPT

## 2021-08-10 PROCEDURE — 84100 ASSAY OF PHOSPHORUS: CPT

## 2021-08-10 PROCEDURE — 84439 ASSAY OF FREE THYROXINE: CPT

## 2021-08-10 PROCEDURE — 83036 HEMOGLOBIN GLYCOSYLATED A1C: CPT

## 2021-08-10 NOTE — TELEPHONE ENCOUNTER
Reclast order faxed to [facility]. Confirmation received.     Copy of form placed in teal accordion file pod 2     Also copy sent to scanning

## 2021-08-25 ENCOUNTER — TELEPHONE (OUTPATIENT)
Dept: INTERNAL MEDICINE CLINIC | Facility: CLINIC | Age: 76
End: 2021-08-25

## 2021-08-25 NOTE — TELEPHONE ENCOUNTER
She will be due for her second booster around 8 months after her second dose, which would be around October. Though we are still waiting for CDC's firm guidelines.

## 2021-08-25 NOTE — TELEPHONE ENCOUNTER
Patient had 2nd pfizer vaccine 2/22/21. Asking:    Should she have antibody test ?   Should she have booster ?

## 2021-08-25 NOTE — TELEPHONE ENCOUNTER
Pt advised that AB testing is only to let us know if she has been exposed to the virus or had the virus. Pt given [place]' recommendations regarding covid booster.

## 2021-11-10 ENCOUNTER — OFFICE VISIT (OUTPATIENT)
Dept: INTERNAL MEDICINE CLINIC | Facility: CLINIC | Age: 76
End: 2021-11-10
Payer: MEDICARE

## 2021-11-10 ENCOUNTER — TELEPHONE (OUTPATIENT)
Dept: INTERNAL MEDICINE CLINIC | Facility: CLINIC | Age: 76
End: 2021-11-10

## 2021-11-10 VITALS
RESPIRATION RATE: 16 BRPM | OXYGEN SATURATION: 98 % | HEART RATE: 62 BPM | HEIGHT: 65.75 IN | WEIGHT: 167.19 LBS | TEMPERATURE: 98 F | DIASTOLIC BLOOD PRESSURE: 80 MMHG | BODY MASS INDEX: 27.19 KG/M2 | SYSTOLIC BLOOD PRESSURE: 190 MMHG

## 2021-11-10 DIAGNOSIS — I15.2 HYPERTENSION ASSOCIATED WITH TYPE 2 DIABETES MELLITUS (HCC): ICD-10-CM

## 2021-11-10 DIAGNOSIS — E11.69 HYPERLIPIDEMIA ASSOCIATED WITH TYPE 2 DIABETES MELLITUS (HCC): ICD-10-CM

## 2021-11-10 DIAGNOSIS — Z12.11 SCREEN FOR COLON CANCER: ICD-10-CM

## 2021-11-10 DIAGNOSIS — Z00.00 ROUTINE GENERAL MEDICAL EXAMINATION AT A HEALTH CARE FACILITY: Primary | ICD-10-CM

## 2021-11-10 DIAGNOSIS — I50.22 CHRONIC SYSTOLIC CONGESTIVE HEART FAILURE (HCC): ICD-10-CM

## 2021-11-10 DIAGNOSIS — Z13.31 SCREENING FOR DEPRESSION: ICD-10-CM

## 2021-11-10 DIAGNOSIS — E11.59 HYPERTENSION ASSOCIATED WITH TYPE 2 DIABETES MELLITUS (HCC): ICD-10-CM

## 2021-11-10 DIAGNOSIS — E11.9 TYPE 2 DIABETES MELLITUS WITHOUT COMPLICATION, WITHOUT LONG-TERM CURRENT USE OF INSULIN (HCC): ICD-10-CM

## 2021-11-10 DIAGNOSIS — E78.5 HYPERLIPIDEMIA ASSOCIATED WITH TYPE 2 DIABETES MELLITUS (HCC): ICD-10-CM

## 2021-11-10 DIAGNOSIS — M81.0 AGE-RELATED OSTEOPOROSIS WITHOUT CURRENT PATHOLOGICAL FRACTURE: ICD-10-CM

## 2021-11-10 DIAGNOSIS — E03.8 OTHER SPECIFIED HYPOTHYROIDISM: ICD-10-CM

## 2021-11-10 PROBLEM — R31.0 GROSS HEMATURIA: Status: RESOLVED | Noted: 2021-08-09 | Resolved: 2021-11-10

## 2021-11-10 PROCEDURE — 99214 OFFICE O/P EST MOD 30 MIN: CPT | Performed by: INTERNAL MEDICINE

## 2021-11-10 PROCEDURE — G0444 DEPRESSION SCREEN ANNUAL: HCPCS | Performed by: INTERNAL MEDICINE

## 2021-11-10 PROCEDURE — G0439 PPPS, SUBSEQ VISIT: HCPCS | Performed by: INTERNAL MEDICINE

## 2021-11-10 NOTE — TELEPHONE ENCOUNTER
Incoming diabetic eye exam via fax from [address] [place] [address] Ophthalmology.   updated, report placed in [place] in basket

## 2021-11-10 NOTE — PROGRESS NOTES
Grady Sams is a 64year old female. HPI:   Patient presents for medicare wellness exam and additional issues noted below.   1. HTN - she has not taken amlodipine in weeks because \" I don't like taking drugs\" and cramping and diarrhea were less se MI   • Other (Autoimmune disease) Other         fam hx   • Heart Disease Other         fam hx   • Other (Other) Self 1973        cervical cancer ( carcinoma of insitu      Past Medical History:   Diagnosis Date   • Endocrine disorder    • History of PTCA controlled: A1C at goal in 8/2021.   - continue to reinforce healthy plant based nutrition  -  DM Eye Exam by [facility] on 11/1/2021 w/o diabetic retinopathy in either eye  - Foot Exam: 8/2021,   - LDL: on repatha  - BP: controlled  - micro alb:creat < understanding of these issues and agrees to the plan. The patient is asked to return in 1 year for AMW.    Clinton Reyna MD

## 2021-11-10 NOTE — PATIENT INSTRUCTIONS
Please resume your amlodipine (norvasc) 2.5 mg every evening for your high blood pressure. Please bring your home blood pressure machine with you when you see me in 3 months. You are due for your labs, stool and urine testing in February, 2022.      You

## 2021-12-27 ENCOUNTER — APPOINTMENT (OUTPATIENT)
Dept: GENERAL RADIOLOGY | Age: 76
End: 2021-12-27
Attending: PHYSICIAN ASSISTANT
Payer: MEDICARE

## 2021-12-27 ENCOUNTER — HOSPITAL ENCOUNTER (OUTPATIENT)
Age: 76
Discharge: HOME | End: 2021-12-27
Payer: MEDICARE

## 2021-12-27 VITALS
RESPIRATION RATE: 16 BRPM | HEART RATE: 64 BPM | TEMPERATURE: 99 F | BODY MASS INDEX: 27 KG/M2 | WEIGHT: 165.38 LBS | DIASTOLIC BLOOD PRESSURE: 86 MMHG | OXYGEN SATURATION: 100 % | SYSTOLIC BLOOD PRESSURE: 151 MMHG

## 2021-12-27 DIAGNOSIS — Z20.822 EXPOSURE TO COVID-19 VIRUS: Primary | ICD-10-CM

## 2021-12-27 DIAGNOSIS — J06.9 VIRAL UPPER RESPIRATORY TRACT INFECTION WITH COUGH: ICD-10-CM

## 2021-12-27 LAB — SARS-COV-2 RNA RESP QL NAA+PROBE: NOT DETECTED

## 2021-12-27 PROCEDURE — 99213 OFFICE O/P EST LOW 20 MIN: CPT

## 2021-12-27 PROCEDURE — 99203 OFFICE O/P NEW LOW 30 MIN: CPT

## 2021-12-27 NOTE — ED PROVIDER NOTES
Patient Seen in: [facility]      History   Patient presents with:  Cough/URI    Stated Complaint: chills,fever    Subjective:   HPI    75-year-old female who comes in today complaining of nasal congestion and cough she states that she jus SpO2 100%   BMI 26.89 kg/m²         Physical Exam    General Appearance: Alert, cooperative, no distress, appropriate for age   Head: Normocephalic, without obvious abnormality   Eyes:  conjunctiva and cornea clear, both eyes   Throat: no trismus or drooli and plan.

## 2021-12-29 ENCOUNTER — TELEPHONE (OUTPATIENT)
Dept: INTERNAL MEDICINE CLINIC | Facility: CLINIC | Age: 76
End: 2021-12-29

## 2021-12-29 DIAGNOSIS — R05.9 COUGH: Primary | ICD-10-CM

## 2021-12-29 DIAGNOSIS — R09.81 NASAL CONGESTION: ICD-10-CM

## 2021-12-29 NOTE — TELEPHONE ENCOUNTER
Pt called office and stated she was seen on Monday the 12/27, there is no documentation of such appointment. Pt states she waited 3 hours to get covid tested and results were negative. Pt states she was advised to test again in a few days.  Pt is requesting

## 2022-01-03 ENCOUNTER — LAB ENCOUNTER (OUTPATIENT)
Dept: LAB | Age: 77
End: 2022-01-03
Attending: INTERNAL MEDICINE
Payer: MEDICARE

## 2022-01-03 DIAGNOSIS — R05.9 COUGH: ICD-10-CM

## 2022-01-03 DIAGNOSIS — E03.8 OTHER SPECIFIED HYPOTHYROIDISM: ICD-10-CM

## 2022-01-03 DIAGNOSIS — R09.81 NASAL CONGESTION: ICD-10-CM

## 2022-01-04 RX ORDER — LEVOTHYROXINE SODIUM 88 UG/1
88 TABLET ORAL
Qty: 90 TABLET | Refills: 1 | Status: SHIPPED | OUTPATIENT
Start: 2022-01-04 | End: 2022-06-06

## 2022-01-05 LAB — SARS-COV-2 RNA RESP QL NAA+PROBE: NOT DETECTED

## 2022-02-08 ENCOUNTER — LAB ENCOUNTER (OUTPATIENT)
Dept: LAB | Age: 77
End: 2022-02-08
Attending: INTERNAL MEDICINE
Payer: MEDICARE

## 2022-02-08 DIAGNOSIS — E11.59 HYPERTENSION ASSOCIATED WITH TYPE 2 DIABETES MELLITUS (HCC): ICD-10-CM

## 2022-02-08 DIAGNOSIS — I50.22 CHRONIC SYSTOLIC CONGESTIVE HEART FAILURE (HCC): ICD-10-CM

## 2022-02-08 DIAGNOSIS — Z13.31 SCREENING FOR DEPRESSION: ICD-10-CM

## 2022-02-08 DIAGNOSIS — E11.9 TYPE 2 DIABETES MELLITUS WITHOUT COMPLICATION, WITHOUT LONG-TERM CURRENT USE OF INSULIN (HCC): ICD-10-CM

## 2022-02-08 DIAGNOSIS — Z12.11 SCREEN FOR COLON CANCER: ICD-10-CM

## 2022-02-08 DIAGNOSIS — E11.69 HYPERLIPIDEMIA ASSOCIATED WITH TYPE 2 DIABETES MELLITUS (HCC): ICD-10-CM

## 2022-02-08 DIAGNOSIS — M81.0 AGE-RELATED OSTEOPOROSIS WITHOUT CURRENT PATHOLOGICAL FRACTURE: ICD-10-CM

## 2022-02-08 DIAGNOSIS — E78.5 HYPERLIPIDEMIA ASSOCIATED WITH TYPE 2 DIABETES MELLITUS (HCC): ICD-10-CM

## 2022-02-08 DIAGNOSIS — Z00.00 ROUTINE GENERAL MEDICAL EXAMINATION AT A HEALTH CARE FACILITY: ICD-10-CM

## 2022-02-08 DIAGNOSIS — E03.8 OTHER SPECIFIED HYPOTHYROIDISM: ICD-10-CM

## 2022-02-08 DIAGNOSIS — I15.2 HYPERTENSION ASSOCIATED WITH TYPE 2 DIABETES MELLITUS (HCC): ICD-10-CM

## 2022-02-08 LAB
ALT SERPL-CCNC: 37 U/L
ANION GAP SERPL CALC-SCNC: 2 MMOL/L (ref 0–18)
AST SERPL-CCNC: 24 U/L (ref 15–37)
BUN BLD-MCNC: 8 MG/DL (ref 7–18)
CALCIUM BLD-MCNC: 9.3 MG/DL (ref 8.5–10.1)
CHLORIDE SERPL-SCNC: 109 MMOL/L (ref 98–112)
CHOLEST SERPL-MCNC: 143 MG/DL (ref ?–200)
CO2 SERPL-SCNC: 29 MMOL/L (ref 21–32)
CREAT BLD-MCNC: 0.86 MG/DL
CREAT UR-SCNC: 209 MG/DL
EST. AVERAGE GLUCOSE BLD GHB EST-MCNC: 174 MG/DL (ref 68–126)
FASTING PATIENT LIPID ANSWER: YES
FASTING STATUS PATIENT QL REPORTED: YES
GLUCOSE BLD-MCNC: 160 MG/DL (ref 70–99)
HBA1C MFR BLD: 7.7 % (ref ?–5.7)
LDLC SERPL CALC-MCNC: 70 MG/DL (ref ?–100)
MICROALBUMIN UR-MCNC: 2.82 MG/DL
MICROALBUMIN/CREAT 24H UR-RTO: 13.5 UG/MG (ref ?–30)
NONHDLC SERPL-MCNC: 91 MG/DL (ref ?–130)
OSMOLALITY SERPL CALC.SUM OF ELEC: 292 MOSM/KG (ref 275–295)
POTASSIUM SERPL-SCNC: 4.3 MMOL/L (ref 3.5–5.1)
SODIUM SERPL-SCNC: 140 MMOL/L (ref 136–145)
TRIGL SERPL-MCNC: 117 MG/DL (ref 30–149)
VIT D+METAB SERPL-MCNC: 36.8 NG/ML (ref 30–100)
VLDLC SERPL CALC-MCNC: 18 MG/DL (ref 0–30)

## 2022-02-08 PROCEDURE — 82043 UR ALBUMIN QUANTITATIVE: CPT

## 2022-02-08 PROCEDURE — 82306 VITAMIN D 25 HYDROXY: CPT

## 2022-02-08 PROCEDURE — 84450 TRANSFERASE (AST) (SGOT): CPT

## 2022-02-08 PROCEDURE — 80048 BASIC METABOLIC PNL TOTAL CA: CPT

## 2022-02-08 PROCEDURE — 83036 HEMOGLOBIN GLYCOSYLATED A1C: CPT

## 2022-02-08 PROCEDURE — 80061 LIPID PANEL: CPT

## 2022-02-08 PROCEDURE — 84460 ALANINE AMINO (ALT) (SGPT): CPT

## 2022-02-08 PROCEDURE — 82570 ASSAY OF URINE CREATININE: CPT

## 2022-02-08 PROCEDURE — 36415 COLL VENOUS BLD VENIPUNCTURE: CPT

## 2022-02-16 ENCOUNTER — OFFICE VISIT (OUTPATIENT)
Dept: INTERNAL MEDICINE CLINIC | Facility: CLINIC | Age: 77
End: 2022-02-16
Payer: MEDICARE

## 2022-02-16 VITALS
DIASTOLIC BLOOD PRESSURE: 62 MMHG | WEIGHT: 167.38 LBS | TEMPERATURE: 98 F | SYSTOLIC BLOOD PRESSURE: 132 MMHG | HEART RATE: 62 BPM | OXYGEN SATURATION: 98 % | RESPIRATION RATE: 16 BRPM | HEIGHT: 65.75 IN | BODY MASS INDEX: 27.22 KG/M2

## 2022-02-16 DIAGNOSIS — E11.9 TYPE 2 DIABETES MELLITUS WITHOUT COMPLICATION, WITHOUT LONG-TERM CURRENT USE OF INSULIN (HCC): ICD-10-CM

## 2022-02-16 DIAGNOSIS — M81.0 AGE-RELATED OSTEOPOROSIS WITHOUT CURRENT PATHOLOGICAL FRACTURE: ICD-10-CM

## 2022-02-16 DIAGNOSIS — E11.59 HYPERTENSION ASSOCIATED WITH TYPE 2 DIABETES MELLITUS (HCC): Primary | ICD-10-CM

## 2022-02-16 DIAGNOSIS — E11.69 HYPERLIPIDEMIA ASSOCIATED WITH TYPE 2 DIABETES MELLITUS (HCC): ICD-10-CM

## 2022-02-16 DIAGNOSIS — I15.2 HYPERTENSION ASSOCIATED WITH TYPE 2 DIABETES MELLITUS (HCC): Primary | ICD-10-CM

## 2022-02-16 DIAGNOSIS — Z12.11 SCREEN FOR COLON CANCER: ICD-10-CM

## 2022-02-16 DIAGNOSIS — E03.8 OTHER SPECIFIED HYPOTHYROIDISM: ICD-10-CM

## 2022-02-16 DIAGNOSIS — E78.5 HYPERLIPIDEMIA ASSOCIATED WITH TYPE 2 DIABETES MELLITUS (HCC): ICD-10-CM

## 2022-02-16 PROBLEM — H25.10 NUCLEAR SENILE CATARACT: Status: ACTIVE | Noted: 2022-02-16

## 2022-02-16 PROCEDURE — 99214 OFFICE O/P EST MOD 30 MIN: CPT | Performed by: INTERNAL MEDICINE

## 2022-02-16 NOTE — PATIENT INSTRUCTIONS
Please call your insurance to investigate cost of your reclast injection for osteoporosis. You are due for your labs in August, 2022. You do not need too fast.    Please send me your blood pressures every 2 weeks.

## 2022-02-17 ENCOUNTER — TELEPHONE (OUTPATIENT)
Dept: INTERNAL MEDICINE CLINIC | Facility: CLINIC | Age: 77
End: 2022-02-17

## 2022-02-17 NOTE — TELEPHONE ENCOUNTER
Patient notified of message below with recommendations from Dr. Valery Hood to continue amlodipine 2.5 mg daily. Patient verbalized understanding at this time.

## 2022-02-17 NOTE — TELEPHONE ENCOUNTER
Pt called to give  her bp readings     1/18: 138/79  Pulse was 62    1/23:119/72  69 pulse rate    1/24:118/68  63 pulse rate    1/25: 143/75  63 pulse rate     1/27: 135/76  65 pulse rate     1/31: 144/82  85 pulse rate     2/1: 120/76  81 pulse rate     2/3: 120/75  72 pulse rate     2/7: 137/84  70 pulse rate     2/12: 109/69  80 pulse rate     She states these were taken in the morning

## 2022-02-17 NOTE — TELEPHONE ENCOUNTER
Her average blood pressure is < 130/80 over these 10 readings so she can continue amlodipine 2.5 mg daily.  TY

## 2022-06-06 DIAGNOSIS — E03.8 OTHER SPECIFIED HYPOTHYROIDISM: ICD-10-CM

## 2022-06-06 RX ORDER — LEVOTHYROXINE SODIUM 88 UG/1
88 TABLET ORAL
Qty: 90 TABLET | Refills: 1 | Status: SHIPPED | OUTPATIENT
Start: 2022-06-06

## 2022-07-11 ENCOUNTER — LAB ENCOUNTER (OUTPATIENT)
Dept: LAB | Age: 77
End: 2022-07-11
Attending: INTERNAL MEDICINE
Payer: MEDICARE

## 2022-07-11 ENCOUNTER — VIRTUAL PHONE E/M (OUTPATIENT)
Dept: INTERNAL MEDICINE CLINIC | Facility: CLINIC | Age: 77
End: 2022-07-11
Payer: MEDICARE

## 2022-07-11 VITALS — TEMPERATURE: 97 F

## 2022-07-11 DIAGNOSIS — E11.69 HYPERLIPIDEMIA ASSOCIATED WITH TYPE 2 DIABETES MELLITUS (HCC): ICD-10-CM

## 2022-07-11 DIAGNOSIS — R06.09 DOE (DYSPNEA ON EXERTION): ICD-10-CM

## 2022-07-11 DIAGNOSIS — E78.5 HYPERLIPIDEMIA ASSOCIATED WITH TYPE 2 DIABETES MELLITUS (HCC): ICD-10-CM

## 2022-07-11 DIAGNOSIS — R05.9 COUGH: ICD-10-CM

## 2022-07-11 DIAGNOSIS — I15.2 HYPERTENSION ASSOCIATED WITH TYPE 2 DIABETES MELLITUS (HCC): ICD-10-CM

## 2022-07-11 DIAGNOSIS — R05.9 COUGH: Primary | ICD-10-CM

## 2022-07-11 DIAGNOSIS — E11.59 HYPERTENSION ASSOCIATED WITH TYPE 2 DIABETES MELLITUS (HCC): ICD-10-CM

## 2022-07-11 DIAGNOSIS — R06.00 DOE (DYSPNEA ON EXERTION): ICD-10-CM

## 2022-07-12 LAB — SARS-COV-2 RNA RESP QL NAA+PROBE: NOT DETECTED

## 2022-09-12 ENCOUNTER — LAB ENCOUNTER (OUTPATIENT)
Dept: LAB | Age: 77
End: 2022-09-12
Attending: INTERNAL MEDICINE
Payer: MEDICARE

## 2022-09-12 DIAGNOSIS — E11.59 HYPERTENSION ASSOCIATED WITH TYPE 2 DIABETES MELLITUS (HCC): ICD-10-CM

## 2022-09-12 DIAGNOSIS — M81.0 AGE-RELATED OSTEOPOROSIS WITHOUT CURRENT PATHOLOGICAL FRACTURE: ICD-10-CM

## 2022-09-12 DIAGNOSIS — Z12.11 SCREEN FOR COLON CANCER: ICD-10-CM

## 2022-09-12 DIAGNOSIS — E03.8 OTHER SPECIFIED HYPOTHYROIDISM: ICD-10-CM

## 2022-09-12 DIAGNOSIS — E78.5 HYPERLIPIDEMIA ASSOCIATED WITH TYPE 2 DIABETES MELLITUS (HCC): ICD-10-CM

## 2022-09-12 DIAGNOSIS — E11.9 TYPE 2 DIABETES MELLITUS WITHOUT COMPLICATION, WITHOUT LONG-TERM CURRENT USE OF INSULIN (HCC): ICD-10-CM

## 2022-09-12 DIAGNOSIS — E11.69 HYPERLIPIDEMIA ASSOCIATED WITH TYPE 2 DIABETES MELLITUS (HCC): ICD-10-CM

## 2022-09-12 DIAGNOSIS — I15.2 HYPERTENSION ASSOCIATED WITH TYPE 2 DIABETES MELLITUS (HCC): ICD-10-CM

## 2022-09-12 LAB
ALT SERPL-CCNC: 41 U/L
ANION GAP SERPL CALC-SCNC: 5 MMOL/L (ref 0–18)
AST SERPL-CCNC: 28 U/L (ref 15–37)
BUN BLD-MCNC: 14 MG/DL (ref 7–18)
CALCIUM BLD-MCNC: 9.3 MG/DL (ref 8.5–10.1)
CHLORIDE SERPL-SCNC: 107 MMOL/L (ref 98–112)
CHOLEST SERPL-MCNC: 140 MG/DL (ref ?–200)
CO2 SERPL-SCNC: 26 MMOL/L (ref 21–32)
CREAT BLD-MCNC: 1 MG/DL
EST. AVERAGE GLUCOSE BLD GHB EST-MCNC: 166 MG/DL (ref 68–126)
FASTING PATIENT LIPID ANSWER: YES
FASTING STATUS PATIENT QL REPORTED: YES
GFR SERPLBLD BASED ON 1.73 SQ M-ARVRAT: 57 ML/MIN/1.73M2 (ref 60–?)
GLUCOSE BLD-MCNC: 160 MG/DL (ref 70–99)
HBA1C MFR BLD: 7.4 % (ref ?–5.7)
HDLC SERPL-MCNC: 50 MG/DL (ref 40–59)
LDLC SERPL CALC-MCNC: 61 MG/DL (ref ?–100)
NONHDLC SERPL-MCNC: 90 MG/DL (ref ?–130)
OSMOLALITY SERPL CALC.SUM OF ELEC: 290 MOSM/KG (ref 275–295)
POTASSIUM SERPL-SCNC: 3.9 MMOL/L (ref 3.5–5.1)
SODIUM SERPL-SCNC: 138 MMOL/L (ref 136–145)
T4 FREE SERPL-MCNC: 1.4 NG/DL (ref 0.8–1.7)
TRIGL SERPL-MCNC: 176 MG/DL (ref 30–149)
TSI SER-ACNC: 2.8 MIU/ML (ref 0.36–3.74)
VIT D+METAB SERPL-MCNC: 39.1 NG/ML (ref 30–100)
VLDLC SERPL CALC-MCNC: 26 MG/DL (ref 0–30)

## 2022-09-12 PROCEDURE — 84443 ASSAY THYROID STIM HORMONE: CPT

## 2022-09-12 PROCEDURE — 82274 ASSAY TEST FOR BLOOD FECAL: CPT

## 2022-09-12 PROCEDURE — 80061 LIPID PANEL: CPT

## 2022-09-12 PROCEDURE — 84439 ASSAY OF FREE THYROXINE: CPT

## 2022-09-12 PROCEDURE — 82306 VITAMIN D 25 HYDROXY: CPT

## 2022-09-12 PROCEDURE — 80048 BASIC METABOLIC PNL TOTAL CA: CPT

## 2022-09-12 PROCEDURE — 84450 TRANSFERASE (AST) (SGOT): CPT

## 2022-09-12 PROCEDURE — 83036 HEMOGLOBIN GLYCOSYLATED A1C: CPT

## 2022-09-12 PROCEDURE — 84460 ALANINE AMINO (ALT) (SGPT): CPT

## 2022-09-12 PROCEDURE — 36415 COLL VENOUS BLD VENIPUNCTURE: CPT

## 2022-09-13 LAB — HEMOCCULT STL QL: NEGATIVE

## 2022-09-20 ENCOUNTER — TELEPHONE (OUTPATIENT)
Dept: INTERNAL MEDICINE CLINIC | Facility: CLINIC | Age: 77
End: 2022-09-20

## 2022-09-20 DIAGNOSIS — E03.8 OTHER SPECIFIED HYPOTHYROIDISM: ICD-10-CM

## 2022-09-20 RX ORDER — LEVOTHYROXINE SODIUM 88 MCG
88 TABLET ORAL
Qty: 30 TABLET | Refills: 1 | Status: SHIPPED | OUTPATIENT
Start: 2022-09-20

## 2022-09-20 NOTE — TELEPHONE ENCOUNTER
I sent BRAND synthroid. Oftentimes this is more expensive that levothyroxine. Thus, I sent a 30 day supply to her local pharmacy rather than the mail order. She should repeat her TSH 6 weeks after changing to synthroid. Order is in system.  TY

## 2022-09-20 NOTE — TELEPHONE ENCOUNTER
Spoke with pt. Pt stated for about 6 months she's had intermittent itchy, red eyes. Inner corner of eyelids get red and itchy. Pt stated her daughter had the same issue with her eyes and her doctor switched her from levothyroxine to synthroid. Pt wondering if she could have the same issue? Pt is willing to wait to discuss this at November 11/11 OV. Pt thought maybe she could switch medications now, and then discuss any improvement at OV. Please advise.

## 2022-09-20 NOTE — TELEPHONE ENCOUNTER
Pt requesting to get off of Levothyroxine and start Synthroid instead. Pt c/o her eyes itchy and red. She stated her daughter had the same issue on Levothyroxine and switched medicine and has now improved.     Please advise

## 2022-10-28 ENCOUNTER — TELEPHONE (OUTPATIENT)
Dept: INTERNAL MEDICINE CLINIC | Facility: CLINIC | Age: 77
End: 2022-10-28

## 2022-10-28 NOTE — TELEPHONE ENCOUNTER
Pt would like to know how common it is for elderly to catch RSV. Pt c/o sore throat, cough and fever.      Please advise

## 2022-10-28 NOTE — TELEPHONE ENCOUNTER
LMTCB. left detailed message that we close at 3:30, but if pt feels she is really sick, she should be seen at [facility].

## 2022-11-04 ENCOUNTER — LAB ENCOUNTER (OUTPATIENT)
Dept: LAB | Age: 77
End: 2022-11-04
Attending: INTERNAL MEDICINE
Payer: MEDICARE

## 2022-11-04 DIAGNOSIS — E03.8 OTHER SPECIFIED HYPOTHYROIDISM: ICD-10-CM

## 2022-11-04 LAB
T4 FREE SERPL-MCNC: 1.6 NG/DL (ref 0.8–1.7)
TSI SER-ACNC: 1.72 MIU/ML (ref 0.36–3.74)

## 2022-11-04 PROCEDURE — 36415 COLL VENOUS BLD VENIPUNCTURE: CPT

## 2022-11-04 PROCEDURE — 84443 ASSAY THYROID STIM HORMONE: CPT

## 2022-11-04 PROCEDURE — 84439 ASSAY OF FREE THYROXINE: CPT

## 2022-11-07 ENCOUNTER — TELEPHONE (OUTPATIENT)
Dept: INTERNAL MEDICINE CLINIC | Facility: CLINIC | Age: 77
End: 2022-11-07

## 2022-11-07 NOTE — TELEPHONE ENCOUNTER
Patient has questions in regards to her TSH results from 11/04/2022 and from 09/12/2022. Would like to speak to a nurse.

## 2022-11-11 ENCOUNTER — OFFICE VISIT (OUTPATIENT)
Dept: INTERNAL MEDICINE CLINIC | Facility: CLINIC | Age: 77
End: 2022-11-11
Payer: MEDICARE

## 2022-11-11 VITALS
OXYGEN SATURATION: 98 % | HEART RATE: 74 BPM | SYSTOLIC BLOOD PRESSURE: 108 MMHG | HEIGHT: 65.5 IN | TEMPERATURE: 98 F | DIASTOLIC BLOOD PRESSURE: 68 MMHG | BODY MASS INDEX: 27.1 KG/M2 | WEIGHT: 164.63 LBS | RESPIRATION RATE: 16 BRPM

## 2022-11-11 DIAGNOSIS — E11.59 HYPERTENSION ASSOCIATED WITH TYPE 2 DIABETES MELLITUS (HCC): ICD-10-CM

## 2022-11-11 DIAGNOSIS — E11.69 HYPERLIPIDEMIA ASSOCIATED WITH TYPE 2 DIABETES MELLITUS (HCC): ICD-10-CM

## 2022-11-11 DIAGNOSIS — E78.5 HYPERLIPIDEMIA ASSOCIATED WITH TYPE 2 DIABETES MELLITUS (HCC): ICD-10-CM

## 2022-11-11 DIAGNOSIS — Z00.00 ROUTINE GENERAL MEDICAL EXAMINATION AT A HEALTH CARE FACILITY: Primary | ICD-10-CM

## 2022-11-11 DIAGNOSIS — E03.8 OTHER SPECIFIED HYPOTHYROIDISM: ICD-10-CM

## 2022-11-11 DIAGNOSIS — E11.9 TYPE 2 DIABETES MELLITUS WITHOUT COMPLICATION, WITHOUT LONG-TERM CURRENT USE OF INSULIN (HCC): ICD-10-CM

## 2022-11-11 DIAGNOSIS — I15.2 HYPERTENSION ASSOCIATED WITH TYPE 2 DIABETES MELLITUS (HCC): ICD-10-CM

## 2022-11-11 DIAGNOSIS — M81.0 AGE-RELATED OSTEOPOROSIS WITHOUT CURRENT PATHOLOGICAL FRACTURE: ICD-10-CM

## 2022-11-11 RX ORDER — LEVOTHYROXINE SODIUM 88 MCG
88 TABLET ORAL
Qty: 90 TABLET | Refills: 3 | Status: SHIPPED | OUTPATIENT
Start: 2022-11-11

## 2022-11-11 NOTE — PATIENT INSTRUCTIONS
You are due for your diabetic eye exam.     You are due for your labs and urine testing in March,2023.

## 2022-11-13 ENCOUNTER — HOSPITAL ENCOUNTER (OUTPATIENT)
Age: 77
Discharge: HOME | End: 2022-11-13
Payer: MEDICARE

## 2022-11-13 ENCOUNTER — MOBILE ENCOUNTER (OUTPATIENT)
Dept: INTERNAL MEDICINE CLINIC | Facility: CLINIC | Age: 77
End: 2022-11-13

## 2022-11-13 VITALS
TEMPERATURE: 98 F | HEIGHT: 66.5 IN | SYSTOLIC BLOOD PRESSURE: 155 MMHG | OXYGEN SATURATION: 95 % | HEART RATE: 65 BPM | BODY MASS INDEX: 26.2 KG/M2 | WEIGHT: 165 LBS | DIASTOLIC BLOOD PRESSURE: 67 MMHG | RESPIRATION RATE: 18 BRPM

## 2022-11-13 DIAGNOSIS — J02.9 ACUTE VIRAL PHARYNGITIS: ICD-10-CM

## 2022-11-13 DIAGNOSIS — J06.9 UPPER RESPIRATORY TRACT INFECTION, UNSPECIFIED TYPE: Primary | ICD-10-CM

## 2022-11-13 LAB — S PYO AG THROAT QL: NEGATIVE

## 2022-11-13 PROCEDURE — 99213 OFFICE O/P EST LOW 20 MIN: CPT

## 2022-11-13 PROCEDURE — 87637 SARSCOV2&INF A&B&RSV AMP PRB: CPT | Performed by: NURSE PRACTITIONER

## 2022-11-13 PROCEDURE — 87880 STREP A ASSAY W/OPTIC: CPT

## 2022-11-13 NOTE — ED INITIAL ASSESSMENT (HPI)
Pt has had a cough for the past 2 weeks was given zpack, and cough medicine, It has not gone away, and she now has severe sore throat, family members with RSV

## 2022-11-13 NOTE — DISCHARGE INSTRUCTIONS
Most people get better in 10 to 14 days. You may continue to cough for 2 to 3 weeks. Colds are caused by viruses and do not get better with antibiotics. Any over-the-counter medications will help relieve your symptoms. 1. Mucinex D, this can be obtained from the pharmacist and a 's license is needed to purchase this. Otherwise Mucinex DM is a decent alternative  2. Chloraseptic throat spray may alleviate your throat pain along with salt water gargles. 3.  Cough syrup such as Delsym or Robitussin can help  4.   You may also take pseudoephedrine (Sudafed), this is also purchased from the pharmacist with a valid 's license  Drink plenty of fluids  Rest, Tylenol and Motrin for aches and pains  Return for fever, shortness of breath, chest pain

## 2022-11-13 NOTE — PROGRESS NOTES
Patient paged requesting antibiotics. Patient did not  call.  Message and [employer] message sent

## 2022-11-14 LAB
FLUAV + FLUBV RNA SPEC NAA+PROBE: NOT DETECTED
FLUAV + FLUBV RNA SPEC NAA+PROBE: NOT DETECTED
RSV RNA SPEC NAA+PROBE: NOT DETECTED
SARS-COV-2 RNA RESP QL NAA+PROBE: NOT DETECTED

## 2022-11-22 ENCOUNTER — TELEPHONE (OUTPATIENT)
Dept: INTERNAL MEDICINE CLINIC | Facility: CLINIC | Age: 77
End: 2022-11-22

## 2022-11-22 RX ORDER — ALBUTEROL SULFATE 90 UG/1
2 AEROSOL, METERED RESPIRATORY (INHALATION) EVERY 6 HOURS PRN
Qty: 18 G | Refills: 0 | Status: SHIPPED | OUTPATIENT
Start: 2022-11-22

## 2022-11-22 RX ORDER — ALBUTEROL SULFATE 90 UG/1
2 AEROSOL, METERED RESPIRATORY (INHALATION) EVERY 6 HOURS PRN
Qty: 18 G | Refills: 0 | Status: SHIPPED | OUTPATIENT
Start: 2022-11-22 | End: 2022-11-22

## 2022-11-22 NOTE — TELEPHONE ENCOUNTER
Pt called stating she was seen at the  last week for a cough and sore throat, pt states she cannot get rid of the throat pain. No appts available, pt requesting to speak with a nurse for recommendations.

## 2022-11-22 NOTE — TELEPHONE ENCOUNTER
Incoming PA request via Epic. Message stating insurance prefers brand name Ventolin. Need to call pharmacy and clarify. Note: [employer]'s covered (formulary) drugs is Ventolin HFA aerosol inhaler (brand). If Ventolin HFA aerosol inhaler (brand) is prescribed, a review by [facility] is not required.

## 2022-11-22 NOTE — TELEPHONE ENCOUNTER
Per pharmacy staff no PA is needed. Patient insurance plan will cover inhaler. Patient will only have a 15$ Copay,    PA cancel through Epic. Task completed.

## 2022-11-22 NOTE — TELEPHONE ENCOUNTER
It can take 6-8 weeks to recover from viral illnesses. The cough is usually the last thing to resolve. For her sore throat she can use tylenol, advil products. For her cough, I have sent albuterol inhaler.

## 2022-11-22 NOTE — TELEPHONE ENCOUNTER
Seen in  11/13 for viral pharyngitis and URI. No Rx given-advised OTC treatment for s/s. Patient with continued sore throat/pain, no relief with OTC. Please advise-TY!

## 2022-12-16 ENCOUNTER — TELEPHONE (OUTPATIENT)
Dept: INTERNAL MEDICINE CLINIC | Facility: CLINIC | Age: 77
End: 2022-12-16

## 2023-01-25 ENCOUNTER — PATIENT MESSAGE (OUTPATIENT)
Dept: INTERNAL MEDICINE CLINIC | Facility: CLINIC | Age: 78
End: 2023-01-25

## 2023-01-25 DIAGNOSIS — E03.8 OTHER SPECIFIED HYPOTHYROIDISM: ICD-10-CM

## 2023-01-25 DIAGNOSIS — E78.5 HYPERLIPIDEMIA ASSOCIATED WITH TYPE 2 DIABETES MELLITUS (HCC): Primary | ICD-10-CM

## 2023-01-25 DIAGNOSIS — E11.69 HYPERLIPIDEMIA ASSOCIATED WITH TYPE 2 DIABETES MELLITUS (HCC): Primary | ICD-10-CM

## 2023-01-26 RX ORDER — LEVOTHYROXINE SODIUM 88 UG/1
88 TABLET ORAL
Qty: 90 TABLET | Refills: 1 | Status: SHIPPED | OUTPATIENT
Start: 2023-01-26

## 2023-01-26 NOTE — TELEPHONE ENCOUNTER
Incoming call from [facility] / [facility]   Needs to discuss Synthroid and clinical questions     - [phone]  Reference number [id number]

## 2023-03-14 ENCOUNTER — LAB ENCOUNTER (OUTPATIENT)
Dept: LAB | Age: 78
End: 2023-03-14
Attending: INTERNAL MEDICINE
Payer: MEDICARE

## 2023-03-14 DIAGNOSIS — E78.5 HYPERLIPIDEMIA ASSOCIATED WITH TYPE 2 DIABETES MELLITUS (HCC): ICD-10-CM

## 2023-03-14 DIAGNOSIS — E11.69 HYPERLIPIDEMIA ASSOCIATED WITH TYPE 2 DIABETES MELLITUS (HCC): ICD-10-CM

## 2023-03-14 DIAGNOSIS — E03.8 OTHER SPECIFIED HYPOTHYROIDISM: ICD-10-CM

## 2023-03-14 LAB
ALT SERPL-CCNC: 47 U/L
ANION GAP SERPL CALC-SCNC: 10 MMOL/L (ref 0–18)
AST SERPL-CCNC: 37 U/L (ref 15–37)
BUN BLD-MCNC: 12 MG/DL (ref 7–18)
CALCIUM BLD-MCNC: 10 MG/DL (ref 8.5–10.1)
CHLORIDE SERPL-SCNC: 100 MMOL/L (ref 98–112)
CO2 SERPL-SCNC: 25 MMOL/L (ref 21–32)
CREAT BLD-MCNC: 1.03 MG/DL
CREAT UR-SCNC: 63.5 MG/DL
EST. AVERAGE GLUCOSE BLD GHB EST-MCNC: 243 MG/DL (ref 68–126)
FASTING STATUS PATIENT QL REPORTED: YES
GFR SERPLBLD BASED ON 1.73 SQ M-ARVRAT: 55 ML/MIN/1.73M2 (ref 60–?)
GLUCOSE BLD-MCNC: 312 MG/DL (ref 70–99)
HBA1C MFR BLD: 10.1 % (ref ?–5.7)
MICROALBUMIN UR-MCNC: 1.56 MG/DL
MICROALBUMIN/CREAT 24H UR-RTO: 24.6 UG/MG (ref ?–30)
OSMOLALITY SERPL CALC.SUM OF ELEC: 292 MOSM/KG (ref 275–295)
POTASSIUM SERPL-SCNC: 4 MMOL/L (ref 3.5–5.1)
SODIUM SERPL-SCNC: 135 MMOL/L (ref 136–145)
T4 FREE SERPL-MCNC: 1.5 NG/DL (ref 0.8–1.7)
TSI SER-ACNC: 1.96 MIU/ML (ref 0.36–3.74)

## 2023-03-14 PROCEDURE — 84460 ALANINE AMINO (ALT) (SGPT): CPT

## 2023-03-14 PROCEDURE — 80048 BASIC METABOLIC PNL TOTAL CA: CPT

## 2023-03-14 PROCEDURE — 84439 ASSAY OF FREE THYROXINE: CPT

## 2023-03-14 PROCEDURE — 82570 ASSAY OF URINE CREATININE: CPT

## 2023-03-14 PROCEDURE — 36415 COLL VENOUS BLD VENIPUNCTURE: CPT

## 2023-03-14 PROCEDURE — 82043 UR ALBUMIN QUANTITATIVE: CPT

## 2023-03-14 PROCEDURE — 84443 ASSAY THYROID STIM HORMONE: CPT

## 2023-03-14 PROCEDURE — 83036 HEMOGLOBIN GLYCOSYLATED A1C: CPT

## 2023-03-14 PROCEDURE — 84450 TRANSFERASE (AST) (SGOT): CPT

## 2023-03-20 ENCOUNTER — TELEPHONE (OUTPATIENT)
Dept: INTERNAL MEDICINE CLINIC | Facility: CLINIC | Age: 78
End: 2023-03-20

## 2023-03-20 NOTE — TELEPHONE ENCOUNTER
----- Message from Mike Monahan MD sent at 3/15/2023  8:47 PM CDT -----  DM has significantly worsened. We need to discuss treatment.

## 2023-03-20 NOTE — TELEPHONE ENCOUNTER
KS: called pt to go over lab results below and she stated that she saw those labs in [place]. But she reached out to Dr. Bren Rain in [place] (because she has seen him in the past). He reviewed her labs and did start her on Metformin 1000 mg daily. She stated she was in the process of switching PCP's due to distance. He reviewed her labs and did start her on Metformin 1000 mg daily. Pt stated it's too hard for her to drive to [address], when Dr. Susanna Long is a mile from her house. She stated to let Dr. Krishna Atkins know  Mandy Bowie loves her dearly and she's only leaving due to distance, not her\". Please advise if there is any \"pt discontinue\" protocol or final appt needed? OR  if I should just let front dest know to discontinue pt? Thanks!

## 2023-03-21 ENCOUNTER — PATIENT MESSAGE (OUTPATIENT)
Dept: INTERNAL MEDICINE CLINIC | Facility: CLINIC | Age: 78
End: 2023-03-21

## 2023-04-26 ENCOUNTER — PATIENT OUTREACH (OUTPATIENT)
Dept: CASE MANAGEMENT | Age: 78
End: 2023-04-26

## 2023-04-26 NOTE — PROCEDURES
The office order for PCP removal request is Denied and finalized on April 26, 2023. [address] Medicare Advantage Patient: Patient is listed on the April 2023 [facility] [place]; patient is assigned to IPA: I[facility] and assigned PCP is Dr. Yony Chery    Office, please contact patient to verify PCP. Inform patient they must contact [employer] to provide the name of current PCP.      After office has verified patient is no longer listed on the [facility] monthly eligibility list and not assigned to Dr. Tierra Prater then office can resubmit a new order for PCP removal request.      Thanks,  [id number] [facility] Team

## 2023-09-20 ENCOUNTER — TELEPHONE (OUTPATIENT)
Dept: INTERNAL MEDICINE CLINIC | Facility: CLINIC | Age: 78
End: 2023-09-20

## 2023-09-20 NOTE — TELEPHONE ENCOUNTER
Called patient to schedule MAS and patient has advise that she has changed PCP. Advised that she must call her insurance to remove Dr Prashanth Sun and add in her new PCP.

## 2023-11-03 ENCOUNTER — PATIENT OUTREACH (OUTPATIENT)
Dept: CASE MANAGEMENT | Age: 78
End: 2023-11-03

## 2023-11-03 NOTE — PROCEDURES
The office order for PCP removal request is Approved and finalized on November 3, 2023.     Thanks,  [facility] [employer]

## (undated) NOTE — LETTER
08/02/21        [address]      Dear Yamilka Graves records indicate that you have outstanding lab work and or testing that was ordered for you and has not yet been completed:  Orders Placed This Encounter      Vitam

## (undated) NOTE — LETTER
10/07/20        [address]      Dear Marielena Pratt records indicate that you have outstanding lab work and or testing that was ordered for you and has not yet been completed:  Orders Placed This Encounter      Occul

## (undated) NOTE — LETTER
11/15/18        [address] [facility]      Dear Maddie Gold,    [address] records indicate that you have outstanding lab work and or testing that was ordered for you and has not yet been completed:  Orders Placed This Encounter

## (undated) NOTE — LETTER
Patient Name: Rubia Fernandez  : 1945  MRN: [id number]  Patient Address: [address] [place] [address]      Coronavirus Disease 2019 (COVID-19)     [facility] is committed to the safety and well-being of our patients, members, employe your symptoms get worse, call your healthcare provider immediately. 3. Get rest and stay hydrated.    4. If you have a medical appointment, call the healthcare provider ahead of time and tell them that you have or may have COVID-19.  5. For medical emergen fever-reducing medications; and  · Improvement in respiratory symptoms (e.g., cough, shortness of breath); and  · At least 10 days have passed since symptoms first appeared OR if asymptomatic patient or date of symptom onset is unclear then use 10 days pos donors must:    · Have had a confirmed diagnosis of COVID-19  · Be symptom-free for at least 14 days*    *Some people will be required to have a repeat COVID-19 test in order to be eligible to donate.  If you’re instructed by Josh that a repeat test is r random. Researchers are trying to identify similarities between people with a Post-COVID condition to better understand if there are risk factors. How do I prevent a Post-COVID condition?   The best way to prevent the long-term symptoms of COVID-19 is

## (undated) NOTE — LETTER
03/23/22        [address]      Dear Andre Boss records indicate that you have outstanding lab work and or testing that was ordered for you and has not yet been completed:  Orders Placed This Encounter      Occult Blood, Fecal, FIT Immunoassay     To provide you with the best possible care, please complete these orders at your earliest convenience. If you have recently completed these orders please disregard this letter. If you have any questions please call the office at Dept: [phone].      Thank you,       Gibson Harper MD

## (undated) NOTE — LETTER
07/23/18        [address] [place] [id number]      Dear Mauri Duenas,    [facility] records indicate that you have outstanding lab work and or testing that was ordered for you and has not yet been completed:        Occult Blood, Fec